# Patient Record
Sex: MALE | Race: WHITE | NOT HISPANIC OR LATINO | Employment: OTHER | URBAN - METROPOLITAN AREA
[De-identification: names, ages, dates, MRNs, and addresses within clinical notes are randomized per-mention and may not be internally consistent; named-entity substitution may affect disease eponyms.]

---

## 2023-08-26 ENCOUNTER — APPOINTMENT (OUTPATIENT)
Dept: CT IMAGING | Facility: HOSPITAL | Age: 63
DRG: 417 | End: 2023-08-26
Payer: MEDICARE

## 2023-08-26 ENCOUNTER — HOSPITAL ENCOUNTER (INPATIENT)
Facility: HOSPITAL | Age: 63
LOS: 1 days | Discharge: HOME | DRG: 417 | End: 2023-08-30
Attending: EMERGENCY MEDICINE | Admitting: INTERNAL MEDICINE
Payer: COMMERCIAL

## 2023-08-26 DIAGNOSIS — I26.99 OTHER ACUTE PULMONARY EMBOLISM WITHOUT ACUTE COR PULMONALE: Primary | ICD-10-CM

## 2023-08-26 DIAGNOSIS — K81.9 CHOLECYSTITIS: ICD-10-CM

## 2023-08-26 DIAGNOSIS — K80.20 GALLSTONES: ICD-10-CM

## 2023-08-26 PROBLEM — E78.5 HYPERLIPIDEMIA: Status: ACTIVE | Noted: 2023-08-26

## 2023-08-26 PROBLEM — E66.9 OBESITY (BMI 30-39.9): Status: ACTIVE | Noted: 2023-08-26

## 2023-08-26 PROBLEM — I10 ESSENTIAL HYPERTENSION: Status: ACTIVE | Noted: 2023-08-26

## 2023-08-26 LAB
ALBUMIN SERPL-MCNC: 4 G/DL (ref 3.5–5.2)
ALBUMIN/GLOB SERPL: 1.5 G/DL
ALP SERPL-CCNC: 86 U/L (ref 39–117)
ALT SERPL W P-5'-P-CCNC: 22 U/L (ref 1–41)
ANION GAP SERPL CALCULATED.3IONS-SCNC: 10 MMOL/L (ref 5–15)
AST SERPL-CCNC: 18 U/L (ref 1–40)
BASOPHILS # BLD AUTO: 0.03 10*3/MM3 (ref 0–0.2)
BASOPHILS NFR BLD AUTO: 0.4 % (ref 0–1.5)
BILIRUB SERPL-MCNC: 0.4 MG/DL (ref 0–1.2)
BILIRUB UR QL STRIP: NEGATIVE
BUN SERPL-MCNC: 17 MG/DL (ref 8–23)
BUN/CREAT SERPL: 13.3 (ref 7–25)
CALCIUM SPEC-SCNC: 8.7 MG/DL (ref 8.6–10.5)
CHLORIDE SERPL-SCNC: 109 MMOL/L (ref 98–107)
CLARITY UR: CLEAR
CO2 SERPL-SCNC: 23 MMOL/L (ref 22–29)
COLOR UR: YELLOW
CREAT SERPL-MCNC: 1.28 MG/DL (ref 0.76–1.27)
D-LACTATE SERPL-SCNC: 1.3 MMOL/L (ref 0.5–2)
DEPRECATED RDW RBC AUTO: 41.9 FL (ref 37–54)
EGFRCR SERPLBLD CKD-EPI 2021: 62.1 ML/MIN/1.73
EOSINOPHIL # BLD AUTO: 0.05 10*3/MM3 (ref 0–0.4)
EOSINOPHIL NFR BLD AUTO: 0.7 % (ref 0.3–6.2)
ERYTHROCYTE [DISTWIDTH] IN BLOOD BY AUTOMATED COUNT: 12.9 % (ref 12.3–15.4)
GEN 5 2HR TROPONIN T REFLEX: 10 NG/L
GLOBULIN UR ELPH-MCNC: 2.7 GM/DL
GLUCOSE SERPL-MCNC: 112 MG/DL (ref 65–99)
GLUCOSE UR STRIP-MCNC: NEGATIVE MG/DL
HCT VFR BLD AUTO: 44.8 % (ref 37.5–51)
HGB BLD-MCNC: 14.6 G/DL (ref 13–17.7)
HGB UR QL STRIP.AUTO: NEGATIVE
HOLD SPECIMEN: NORMAL
IMM GRANULOCYTES # BLD AUTO: 0.03 10*3/MM3 (ref 0–0.05)
IMM GRANULOCYTES NFR BLD AUTO: 0.4 % (ref 0–0.5)
KETONES UR QL STRIP: ABNORMAL
LEUKOCYTE ESTERASE UR QL STRIP.AUTO: NEGATIVE
LIPASE SERPL-CCNC: 51 U/L (ref 13–60)
LYMPHOCYTES # BLD AUTO: 0.75 10*3/MM3 (ref 0.7–3.1)
LYMPHOCYTES NFR BLD AUTO: 9.8 % (ref 19.6–45.3)
MCH RBC QN AUTO: 28.8 PG (ref 26.6–33)
MCHC RBC AUTO-ENTMCNC: 32.6 G/DL (ref 31.5–35.7)
MCV RBC AUTO: 88.4 FL (ref 79–97)
MONOCYTES # BLD AUTO: 0.68 10*3/MM3 (ref 0.1–0.9)
MONOCYTES NFR BLD AUTO: 8.9 % (ref 5–12)
NEUTROPHILS NFR BLD AUTO: 6.1 10*3/MM3 (ref 1.7–7)
NEUTROPHILS NFR BLD AUTO: 79.8 % (ref 42.7–76)
NITRITE UR QL STRIP: NEGATIVE
NRBC BLD AUTO-RTO: 0 /100 WBC (ref 0–0.2)
NT-PROBNP SERPL-MCNC: 97.5 PG/ML (ref 0–900)
PH UR STRIP.AUTO: 5.5 [PH] (ref 5–8)
PLATELET # BLD AUTO: 294 10*3/MM3 (ref 140–450)
PMV BLD AUTO: 9.6 FL (ref 6–12)
POTASSIUM SERPL-SCNC: 4.3 MMOL/L (ref 3.5–5.2)
PROT SERPL-MCNC: 6.7 G/DL (ref 6–8.5)
PROT UR QL STRIP: NEGATIVE
QT INTERVAL: 466 MS
QTC INTERVAL: 398 MS
RBC # BLD AUTO: 5.07 10*6/MM3 (ref 4.14–5.8)
SODIUM SERPL-SCNC: 142 MMOL/L (ref 136–145)
SP GR UR STRIP: 1.02 (ref 1–1.03)
TROPONIN T DELTA: 0 NG/L
TROPONIN T SERPL HS-MCNC: 10 NG/L
UFH PPP CHRO-ACNC: 0.47 IU/ML (ref 0.3–0.7)
UROBILINOGEN UR QL STRIP: ABNORMAL
WBC NRBC COR # BLD: 7.64 10*3/MM3 (ref 3.4–10.8)
WHOLE BLOOD HOLD COAG: NORMAL
WHOLE BLOOD HOLD SPECIMEN: NORMAL

## 2023-08-26 PROCEDURE — 80053 COMPREHEN METABOLIC PANEL: CPT | Performed by: EMERGENCY MEDICINE

## 2023-08-26 PROCEDURE — 25010000002 HEPARIN (PORCINE) 25000-0.45 UT/250ML-% SOLUTION: Performed by: NURSE PRACTITIONER

## 2023-08-26 PROCEDURE — 83880 ASSAY OF NATRIURETIC PEPTIDE: CPT | Performed by: NURSE PRACTITIONER

## 2023-08-26 PROCEDURE — 74177 CT ABD & PELVIS W/CONTRAST: CPT

## 2023-08-26 PROCEDURE — G0378 HOSPITAL OBSERVATION PER HR: HCPCS

## 2023-08-26 PROCEDURE — 25010000002 HEPARIN (PORCINE) PER 1000 UNITS: Performed by: NURSE PRACTITIONER

## 2023-08-26 PROCEDURE — 99285 EMERGENCY DEPT VISIT HI MDM: CPT

## 2023-08-26 PROCEDURE — 81003 URINALYSIS AUTO W/O SCOPE: CPT | Performed by: EMERGENCY MEDICINE

## 2023-08-26 PROCEDURE — 83690 ASSAY OF LIPASE: CPT | Performed by: EMERGENCY MEDICINE

## 2023-08-26 PROCEDURE — 36415 COLL VENOUS BLD VENIPUNCTURE: CPT

## 2023-08-26 PROCEDURE — 99291 CRITICAL CARE FIRST HOUR: CPT

## 2023-08-26 PROCEDURE — 71275 CT ANGIOGRAPHY CHEST: CPT

## 2023-08-26 PROCEDURE — 84484 ASSAY OF TROPONIN QUANT: CPT | Performed by: NURSE PRACTITIONER

## 2023-08-26 PROCEDURE — 25510000001 IOPAMIDOL PER 1 ML: Performed by: EMERGENCY MEDICINE

## 2023-08-26 PROCEDURE — 85520 HEPARIN ASSAY: CPT

## 2023-08-26 PROCEDURE — 83605 ASSAY OF LACTIC ACID: CPT | Performed by: EMERGENCY MEDICINE

## 2023-08-26 PROCEDURE — 25010000002 HEPARIN (PORCINE) 25000-0.45 UT/250ML-% SOLUTION: Performed by: PHARMACIST

## 2023-08-26 PROCEDURE — 85025 COMPLETE CBC W/AUTO DIFF WBC: CPT | Performed by: EMERGENCY MEDICINE

## 2023-08-26 PROCEDURE — 93005 ELECTROCARDIOGRAM TRACING: CPT | Performed by: NURSE PRACTITIONER

## 2023-08-26 RX ORDER — AMLODIPINE BESYLATE 5 MG/1
5 TABLET ORAL DAILY
Status: DISCONTINUED | OUTPATIENT
Start: 2023-08-26 | End: 2023-08-30 | Stop reason: HOSPADM

## 2023-08-26 RX ORDER — MORPHINE SULFATE 2 MG/ML
2 INJECTION, SOLUTION INTRAMUSCULAR; INTRAVENOUS
Status: DISCONTINUED | OUTPATIENT
Start: 2023-08-26 | End: 2023-08-30 | Stop reason: HOSPADM

## 2023-08-26 RX ORDER — LOSARTAN POTASSIUM 50 MG/1
100 TABLET ORAL DAILY
Status: DISCONTINUED | OUTPATIENT
Start: 2023-08-26 | End: 2023-08-26

## 2023-08-26 RX ORDER — MULTIPLE VITAMINS W/ MINERALS TAB 9MG-400MCG
1 TAB ORAL DAILY
COMMUNITY

## 2023-08-26 RX ORDER — HYDROCODONE BITARTRATE AND ACETAMINOPHEN 5; 325 MG/1; MG/1
1 TABLET ORAL EVERY 4 HOURS PRN
Status: DISCONTINUED | OUTPATIENT
Start: 2023-08-26 | End: 2023-08-30 | Stop reason: HOSPADM

## 2023-08-26 RX ORDER — NAPROXEN 500 MG/1
500 TABLET ORAL 2 TIMES DAILY WITH MEALS
COMMUNITY
End: 2023-08-30 | Stop reason: HOSPADM

## 2023-08-26 RX ORDER — HEPARIN SODIUM 1000 [USP'U]/ML
2000 INJECTION, SOLUTION INTRAVENOUS; SUBCUTANEOUS AS NEEDED
Status: DISCONTINUED | OUTPATIENT
Start: 2023-08-26 | End: 2023-08-26

## 2023-08-26 RX ORDER — HEPARIN SODIUM 1000 [USP'U]/ML
9000 INJECTION, SOLUTION INTRAVENOUS; SUBCUTANEOUS ONCE
Status: COMPLETED | OUTPATIENT
Start: 2023-08-26 | End: 2023-08-26

## 2023-08-26 RX ORDER — HEPARIN SODIUM 1000 [USP'U]/ML
4000 INJECTION, SOLUTION INTRAVENOUS; SUBCUTANEOUS AS NEEDED
Status: DISCONTINUED | OUTPATIENT
Start: 2023-08-26 | End: 2023-08-26

## 2023-08-26 RX ORDER — MELATONIN
1000 DAILY
COMMUNITY

## 2023-08-26 RX ORDER — SODIUM CHLORIDE 9 MG/ML
10 INJECTION INTRAVENOUS AS NEEDED
Status: DISCONTINUED | OUTPATIENT
Start: 2023-08-26 | End: 2023-08-30 | Stop reason: HOSPADM

## 2023-08-26 RX ORDER — HEPARIN SODIUM 10000 [USP'U]/100ML
13.5 INJECTION, SOLUTION INTRAVENOUS
Status: DISCONTINUED | OUTPATIENT
Start: 2023-08-26 | End: 2023-08-27

## 2023-08-26 RX ORDER — ATENOLOL 50 MG/1
50 TABLET ORAL DAILY
Status: DISCONTINUED | OUTPATIENT
Start: 2023-08-26 | End: 2023-08-27

## 2023-08-26 RX ORDER — PANTOPRAZOLE SODIUM 40 MG/1
40 TABLET, DELAYED RELEASE ORAL DAILY
Status: DISCONTINUED | OUTPATIENT
Start: 2023-08-26 | End: 2023-08-30 | Stop reason: HOSPADM

## 2023-08-26 RX ORDER — ONDANSETRON 2 MG/ML
4 INJECTION INTRAMUSCULAR; INTRAVENOUS EVERY 6 HOURS PRN
Status: DISCONTINUED | OUTPATIENT
Start: 2023-08-26 | End: 2023-08-30 | Stop reason: HOSPADM

## 2023-08-26 RX ADMIN — PANTOPRAZOLE SODIUM 40 MG: 40 TABLET, DELAYED RELEASE ORAL at 16:09

## 2023-08-26 RX ADMIN — AMLODIPINE BESYLATE 5 MG: 5 TABLET ORAL at 16:09

## 2023-08-26 RX ADMIN — IOPAMIDOL 85 ML: 755 INJECTION, SOLUTION INTRAVENOUS at 13:00

## 2023-08-26 RX ADMIN — HEPARIN SODIUM 9000 UNITS: 1000 INJECTION, SOLUTION INTRAVENOUS; SUBCUTANEOUS at 14:25

## 2023-08-26 RX ADMIN — HEPARIN SODIUM 13.5 UNITS/KG/HR: 10000 INJECTION, SOLUTION INTRAVENOUS at 14:26

## 2023-08-26 RX ADMIN — HEPARIN SODIUM 13.5 UNITS/KG/HR: 10000 INJECTION, SOLUTION INTRAVENOUS at 16:09

## 2023-08-26 RX ADMIN — SODIUM CHLORIDE 1000 ML: 9 INJECTION, SOLUTION INTRAVENOUS at 11:47

## 2023-08-26 NOTE — H&P
[facility] Medicine Services  HISTORY AND PHYSICAL    Patient Name: Juan Suero  : 1960  MRN: [id number]  Primary Care Physician: Provider, No Known    Subjective   Subjective     Chief Complaint:ruq abdominal pain    HPI:  Juan Suero is a 63 y.o. male who  has a past medical history of Hyperlipidemia and Hypertension. who presented to the ED with 3 days of persistent RUQ abdominal pain worse with a deep breath and initiated after eating several slices of singh. He has been traveling every other week to and from [place] where his wife is being treated at [facility] for breast and metastatic colon cancer. He denies ever having pain like this before but has been unable to sleep for 3 days and had a poor appetite.      Review of Systems   Patient denies weight loss, headaches, changes in vision, fever, chills, sore throat, shortness of breath, cough, nausea or vomiting, diarrhea, change in urine output or habits, joint pain, rash, itching or bleeding.    Otherwise complete ROS is negative except as mentioned in the HPI.    Personal History       PMH: He  has a past medical history of Hyperlipidemia and Hypertension.   PSxH: He  has a past surgical history that includes Hernia repair and [place] tooth extraction.         FH: His family history is reviewed and NOT contributory.   SH: He  reports that he has never smoked. He has never used smokeless tobacco. He reports current alcohol use. Drug use questions deferred to the physician.   Retired  from [employer]  Medications:  amLODIPine, aspirin, atenolol, cholecalciferol, losartan, multivitamin with minerals, naproxen, and pantoprazole    Allergies   Allergen Reactions    Sulfa Antibiotics Hives       Objective   Objective     Vital Signs:   Temp:  [98.1 °F (36.7 °C)] 98.1 °F (36.7 °C)  Heart Rate:  [45-61] 50  Resp:  [18] 18  BP: (148-170)/(84-98) 156/92    Constitutional: Awake, alert, interactive and pleasant, uncomfortable  Eyes:  clear sclerae, no conjunctival injection  HENT: NCAT, mucous membranes moist  Neck: no masses or lymphadenopathy, trachea midline  Respiratory: good breath sounds bilaterally, respirations unlabored  Cardiovascular: RRR, no murmurs appreciated, palpable peripheral pulses  Abdomen:  soft, no HSM or masses palpable, exquisitely tender RUQ with rebound and guarding  Musculoskeletal: No peripheral edema, clubbing or cyanosis  Neurologic: Oriented x 3,                       Strength symmetric in all extremities                     Cranial Nerves grossly intact, speech clear  Skin: No rashes or jaundice  Psychiatric: Appropriate mood, insight      Result Review:  I have personally reviewed the results from the time of this admission   to 8/26/2023 14:41 EDT and agree with these findings:  [x]  Laboratory  [x]  Microbiology  [x]  Radiology  [x]  EKG/Telemetry   []  Cardiology/Vascular   []  Pathology  [x]  Old records  []  Other:  Most notable findings include: normal CBC and CMP, slight bump in creatinine and glucose, normal lactic acid      LAB RESULTS:      Lab 08/26/23  1038   WBC 7.64   HEMOGLOBIN 14.6   HEMATOCRIT 44.8   PLATELETS 294   NEUTROS ABS 6.10   IMMATURE GRANS (ABS) 0.03   LYMPHS ABS 0.75   MONOS ABS 0.68   EOS ABS 0.05   MCV 88.4   LACTATE 1.3         Lab 08/26/23  1038   SODIUM 142   POTASSIUM 4.3   CHLORIDE 109*   CO2 23.0   ANION GAP 10.0   BUN 17   CREATININE 1.28*   EGFR 62.1   GLUCOSE 112*   CALCIUM 8.7         Lab 08/26/23  1038   TOTAL PROTEIN 6.7   ALBUMIN 4.0   GLOBULIN 2.7   ALT (SGPT) 22   AST (SGOT) 18   BILIRUBIN 0.4   ALK PHOS 86   LIPASE 51         Lab 08/26/23  1239 08/26/23  1038   PROBNP  --  97.5   HSTROP T 10 10                 Brief Urine Lab Results  (Last result in the past 365 days)        Color   Clarity   Blood   Leuk Est   Nitrite   Protein   CREAT   Urine HCG        08/26/23 1043 Yellow   Clear   Negative   Negative   Negative   Negative                     CT Abdomen Pelvis  With Contrast    Result Date: 8/26/2023  CT ANGIOGRAM CHEST, CT ABDOMEN PELVIS W CONTRAST Date of Exam: 8/26/2023 12:49 PM EDT Indication: PE. HISTORY OF HISTO.. Comparison: None available. Technique: CTA of the chest abdomen and pelvis was performed after the uneventful intravenous administration of 85 mL Isovue-370. Reconstructed coronal and sagittal images were also obtained. In addition, a 3-D volume rendered image was created for interpretation. Automated exposure control and iterative reconstruction methods were used. Findings: Chest: The central tracheobronchial tree is clear. A couple benign calcified granulomas are noted. There is no focal consolidation. There is no pleural effusion. The heart is enlarged. The great vessels are normal in caliber. There are several pulmonary emboli within left upper and right lower lobe segmental branches. There is no evidence of acute right heart strain. No abnormally enlarged lymph nodes are identified. No aggressive osseous lesions are identified. Abdomen/pelvis: The liver, adrenal glands, left kidney, spleen, and pancreas are unremarkable. There are bulky stones in the gallbladder. There is a small right renal cyst. The stomach appears normal. The small bowel appears normal in caliber and configuration. The colon appears normal. The appendix appears normal. There is no ascites or loculated collection. No abnormally enlarged lymph nodes are identified. The rectum, prostate, and urinary bladder are unremarkable. No aggressive osseous lesions are identified.     Impression: Impression: 1.Several pulmonary emboli within left upper and right lower lobe segmental branches. 2.Lungs are clear. 3.Cardiomegaly. 4.No acute process identified within abdomen/pelvis 5.Cholelithiasis. Results were discussed with DONNA Wiley at time of dictation. Electronically Signed: Jonathon Laws MD  8/26/2023 1:20 PM EDT  Workstation ID: [id number]    CT Angiogram Chest    Result Date:  8/26/2023  CT ANGIOGRAM CHEST, CT ABDOMEN PELVIS W CONTRAST Date of Exam: 8/26/2023 12:49 PM EDT Indication: PE. HISTORY OF HISTO.. Comparison: None available. Technique: CTA of the chest abdomen and pelvis was performed after the uneventful intravenous administration of 85 mL Isovue-370. Reconstructed coronal and sagittal images were also obtained. In addition, a 3-D volume rendered image was created for interpretation. Automated exposure control and iterative reconstruction methods were used. Findings: Chest: The central tracheobronchial tree is clear. A couple benign calcified granulomas are noted. There is no focal consolidation. There is no pleural effusion. The heart is enlarged. The great vessels are normal in caliber. There are several pulmonary emboli within left upper and right lower lobe segmental branches. There is no evidence of acute right heart strain. No abnormally enlarged lymph nodes are identified. No aggressive osseous lesions are identified. Abdomen/pelvis: The liver, adrenal glands, left kidney, spleen, and pancreas are unremarkable. There are bulky stones in the gallbladder. There is a small right renal cyst. The stomach appears normal. The small bowel appears normal in caliber and configuration. The colon appears normal. The appendix appears normal. There is no ascites or loculated collection. No abnormally enlarged lymph nodes are identified. The rectum, prostate, and urinary bladder are unremarkable. No aggressive osseous lesions are identified.     Impression: Impression: 1.Several pulmonary emboli within left upper and right lower lobe segmental branches. 2.Lungs are clear. 3.Cardiomegaly. 4.No acute process identified within abdomen/pelvis 5.Cholelithiasis. Results were discussed with DONNA Wiley at time of dictation. Electronically Signed: Jonathon Laws MD  8/26/2023 1:20 PM EDT  Workstation ID: [id number]         The patient has started, but not completed, their COVID-19  vaccination series.    Assessment & Plan   Assessment / Plan       Pulmonary embolism    Gallstones    Essential hypertension    Obesity (BMI 30-39.9)    Hyperlipidemia      Assessment & Plan:  64 yo with HO HTN,HLP and obesity who presented to the ED with 3 days of RUQ abdominal pain     Symptomatic gallstones  -will ask Gen surg to see, keep NPO  -discussed risks of surgery with known PE but also that the gallstones seem to be his primary complaint.  -cont symptomatic medications  -serial exams  -NPO after midnight    Bilateral PE  -patient started on heparin until seen by surgery  -transition to NOAC once surgery has been ruled out  -check Duplex for more clot burden    HTN with elevated creatinine  -cone beta blocker, CCB hold ARB for now  Creatinine 1.19 a year ago    Morbid obesity, BMI 36    Hyperlipidemia  -cont statin    DVT prophylaxis:  Medical DVT prophylaxis orders are present.    CODE STATUS:FULL CODE     Expected Discharge  Expected Discharge Date: 8/27/2023; Expected Discharge Time:     Electronically signed by Aurelia Alonso MD 08/26/23 14:41 EDT

## 2023-08-26 NOTE — ED PROVIDER NOTES
Subjective   History of Present Illness  Pleasant patient who presents to the ER for right upper quadrant abdominal pain.  Off-and-on for last several days.  Tells me he did have a large amount of singh recently.  He typically spends most of his time in [place] and is planning to move here but is currently fixing up the house.  Family at the bedside.  He is concerned about his gallbladder.  He also tells me he has some pleuritic pain in that area as well.  He does have a history of histoplasmosis.  He denies any fevers or chills.  Denies any near syncope.  Denies any urinary symptoms.      Review of Systems    Past Medical History:   Diagnosis Date    Hyperlipidemia     Hypertension        Allergies   Allergen Reactions    Sulfa Antibiotics Hives       Past Surgical History:   Procedure Laterality Date    HERNIA REPAIR      WISDOM TOOTH EXTRACTION         History reviewed. No pertinent family history.    Social History     Socioeconomic History    Marital status:    Tobacco Use    Smoking status: Never    Smokeless tobacco: Never   Substance and Sexual Activity    Alcohol use: Yes    Drug use: Defer    Sexual activity: Defer           Objective   Physical Exam  Constitutional:       Appearance: He is well-developed.   HENT:      Head: Normocephalic and atraumatic.      Right Ear: External ear normal.      Left Ear: External ear normal.      Nose: Nose normal.   Eyes:      Conjunctiva/sclera: Conjunctivae normal.      Pupils: Pupils are equal, round, and reactive to light.   Cardiovascular:      Rate and Rhythm: Normal rate and regular rhythm.      Heart sounds: Normal heart sounds.   Pulmonary:      Effort: Pulmonary effort is normal.      Breath sounds: Normal breath sounds.   Abdominal:      General: Bowel sounds are normal.      Palpations: Abdomen is soft.      Tenderness:  in the right upper quadrant   Musculoskeletal:         General: Normal range of motion.      Cervical back: Normal range of motion and  neck supple.   Skin:     General: Skin is warm and dry.   Neurological:      Mental Status: He is alert and oriented to person, place, and time.   Psychiatric:         Behavior: Behavior normal.         Judgment: Judgment normal.       Critical Care  Performed by: Marty Lee APRN  Authorized by: Jonathon Martínez MD     Critical care provider statement:     Critical care time (minutes):  35    Critical care time was exclusive of:  Separately billable procedures and treating other patients    Critical care was necessary to treat or prevent imminent or life-threatening deterioration of the following conditions: Anticoagulation.  Pulmonary embolism.    Critical care was time spent personally by me on the following activities:  Ordering and performing treatments and interventions, ordering and review of laboratory studies, ordering and review of radiographic studies, pulse oximetry, re-evaluation of patient's condition, review of old charts, obtaining history from patient or surrogate, examination of patient, evaluation of patient's response to treatment, discussions with consultants and development of treatment plan with patient or surrogate           ED Course  ED Course as of 08/26/23 1359   Sat Aug 26, 2023   1241 Awaiting second troponin and CAT scans. [JM]      ED Course User Index  [JM] Marty Lee APRN           CT Angiogram Chest   Final Result   Impression:   1.Several pulmonary emboli within left upper and right lower lobe segmental branches.   2.Lungs are clear.   3.Cardiomegaly.   4.No acute process identified within abdomen/pelvis   5.Cholelithiasis.      Results were discussed with DONNA Wiley at time of dictation.      Electronically Signed: Jonathon Laws MD     8/26/2023 1:20 PM EDT     Workstation ID: [id number]      CT Abdomen Pelvis With Contrast   Final Result   Impression:   1.Several pulmonary emboli within left upper and right lower lobe segmental branches.   2.Lungs are clear.    3.Cardiomegaly.   4.No acute process identified within abdomen/pelvis   5.Cholelithiasis.      Results were discussed with DONNA Wiley at time of dictation.      Electronically Signed: Jonathon Laws MD     8/26/2023 1:20 PM EDT     Workstation ID: [id number]                                        Medical Decision Making  Problems Addressed:  Gallstones: complicated acute illness or injury  Other acute pulmonary embolism without acute cor pulmonale: complicated acute illness or injury    Amount and/or Complexity of Data Reviewed  External Data Reviewed: labs, radiology and ECG.  Labs: ordered.  Radiology: ordered.  ECG/medicine tests: ordered.    Risk  Prescription drug management.  Decision regarding hospitalization.    Critical Care  Total time providing critical care: 35 minutes      Final diagnoses:   Other acute pulmonary embolism without acute cor pulmonale   Gallstones       ED Disposition  ED Disposition       ED Disposition   Decision to Admit    Condition   --    Comment   --               No follow-up provider specified.       Medication List      No changes were made to your prescriptions during this visit.            Marty Lee APRN  08/26/23 8544

## 2023-08-26 NOTE — CONSULTS
General Surgery Consultation Note    Date of Service: 8/26/2023  Juan Suero  [id number]  1960      Referring Provider: Aurelia Alonso MD    Location of Consult: Inpatient     Reason for Consultation: Symptomatic cholelithiasis       History of Present Illness:  I am seeing, Juan Suero, in consultation for Aurelia Alonso MD regarding symptomatic cholelithiasis.  63-year-old gentleman with hypertension, hypercholesterolemia, along with known gallstones presents with 2 days of right upper quadrant abdominal pain.  This began after eating breakfast and has persisted x48 hours.  At the behest of his daughter he came for evaluation.  He was worked up in the emergency room with a CT scan of the chest, abdomen, and pelvis which demonstrated bilateral pulmonary emboli and cholelithiasis.  He denies any significant shortness of breath currently.  He does have some pleuritic discomfort.  Otherwise he denies nausea, vomiting, scleral icterus, dark urine, or acholic stools.    Problems Addressed this Visit          Coag and Thromboembolic    * (Principal) Pulmonary embolism - Primary       Gastrointestinal Abdominal     Gallstones     Diagnoses         Codes Comments    Other acute pulmonary embolism without acute cor pulmonale    -  Primary ICD-10-CM: I26.99  ICD-9-CM: 415.19     Gallstones     ICD-10-CM: K80.20  ICD-9-CM: 574.20             Past Medical History:   Diagnosis Date    Hyperlipidemia     Hypertension        Past Surgical History:    HERNIA REPAIR    WISDOM TOOTH EXTRACTION       Allergies   Allergen Reactions    Sulfa Antibiotics Hives       No current facility-administered medications on file prior to encounter.     Current Outpatient Medications on File Prior to Encounter   Medication Sig Dispense Refill    amLODIPine (NORVASC) 5 MG tablet Take 1 tablet by mouth Daily.      aspirin 81 MG EC tablet Take 1 tablet by mouth Daily.      atenolol (TENORMIN) 50 MG tablet Take 1 tablet by mouth Daily.       cholecalciferol (VITAMIN D3) 25 MCG (1000 UT) tablet Take 1 tablet by mouth Daily.      losartan (COZAAR) 100 MG tablet Take 1 tablet by mouth Daily.  1    multivitamin with minerals tablet tablet Take 1 tablet by mouth Daily.      naproxen (NAPROSYN) 500 MG tablet Take 1 tablet by mouth 2 (Two) Times a Day With Meals.      pantoprazole (PROTONIX) 40 MG EC tablet Take 1 tablet by mouth Daily.      [DISCONTINUED] sildenafil (VIAGRA) 100 MG tablet TAKE ONE HALF TO ONE TABLET BY MOUTH 1/2 HOUR BEFORE SEXUAL INTERCOURSE           Current Facility-Administered Medications:     amLODIPine (NORVASC) tablet 5 mg, 5 mg, Oral, Daily, Aurelia Alonso MD, 5 mg at 08/26/23 1609    atenolol (TENORMIN) tablet 50 mg, 50 mg, Oral, Daily, Aurelia Alonso MD    heparin 48856 units/250 mL (100 units/mL) in 0.45 % NaCl infusion, 13.5 Units/kg/hr, Intravenous, Titrated, Marty Lee APRN, Last Rate: 15.25 mL/hr at 08/26/23 1609, 13.5 Units/kg/hr at 08/26/23 1609    HYDROcodone-acetaminophen (NORCO) 5-325 MG per tablet 1 tablet, 1 tablet, Oral, Q4H PRN, Aurelia lAonso MD    morphine injection 2 mg, 2 mg, Intravenous, Q2H PRN, Aurelia Alonso MD    ondansetron (ZOFRAN) injection 4 mg, 4 mg, Intravenous, Q6H PRN, Aurelia Alonso MD    pantoprazole (PROTONIX) EC tablet 40 mg, 40 mg, Oral, Daily, Aurelia Alonso MD, 40 mg at 08/26/23 1609    Pharmacy to Dose Heparin, , Does not apply, Continuous PRN, Marty Lee APRN    Sodium Chloride (PF) 0.9 % 10 mL, 10 mL, Intravenous, PRN, Jonathon Martínez MD    History reviewed. No pertinent family history.  Social History     Socioeconomic History    Marital status:    Tobacco Use    Smoking status: Never    Smokeless tobacco: Never   Vaping Use    Vaping Use: Never used   Substance and Sexual Activity    Alcohol use: Yes    Drug use: Defer    Sexual activity: Defer       Review of Systems:  Review of Systems   Constitutional:  Negative for appetite change and fever.   HENT:   "Negative for drooling, facial swelling and mouth sores.    Eyes:  Negative for photophobia and visual disturbance.   Respiratory:  Negative for cough and shortness of breath.    Cardiovascular:  Negative for palpitations and leg swelling.   Gastrointestinal:  Positive for abdominal pain. Negative for nausea and vomiting.   Endocrine: Negative for polyphagia and polyuria.   Genitourinary:  Negative for difficulty urinating, flank pain and urgency.   Musculoskeletal:  Negative for arthralgias, joint swelling and neck pain.   Skin:  Negative for pallor and rash.   Allergic/Immunologic: Negative for immunocompromised state.   Neurological:  Negative for tremors, seizures and headaches.   Hematological:  Negative for adenopathy.   Psychiatric/Behavioral:  Negative for confusion and sleep disturbance. The patient is not hyperactive.    Otherwise the 12 point review of systems is negative.    BP (!) 187/99 (BP Location: Right arm, Patient Position: Lying)   Pulse (!) 44   Temp 98.3 °F (36.8 °C) (Oral)   Resp 18   Ht 177.8 cm (70\")   Wt 113 kg (250 lb)   SpO2 93%   BMI 35.87 kg/m²   Body mass index is 35.87 kg/m².    General: Minimal distress  HEENT: PER, no icterus, normal sclerae  Cardiac: regular rhythm,  no audible rubs  Pulmonary: bilateral breath sounds, nonlabored  Abdominal: Soft, right upper quadrant tenderness, no generalized peritonitis  Neurologic: awake, alert, no obvious focal deficits  Extremities: warm, no edema  Skin: no obvious rashes nor worrisome lesions seen     CBC  Results from last 7 days   Lab Units 08/26/23  1038   WBC 10*3/mm3 7.64   HEMOGLOBIN g/dL 14.6   HEMATOCRIT % 44.8   PLATELETS 10*3/mm3 294       CMP  Results from last 7 days   Lab Units 08/26/23  1038   SODIUM mmol/L 142   POTASSIUM mmol/L 4.3   CHLORIDE mmol/L 109*   CO2 mmol/L 23.0   BUN mg/dL 17   CREATININE mg/dL 1.28*   CALCIUM mg/dL 8.7   BILIRUBIN mg/dL 0.4   ALK PHOS U/L 86   ALT (SGPT) U/L 22   AST (SGOT) U/L 18   GLUCOSE " mg/dL 112*       Radiology  Imaging Results (Last 72 Hours)       Procedure Component Value Units Date/Time    CT Angiogram Chest [[id number]] Collected: 08/26/23 1306     Updated: 08/26/23 1323    Narrative:      CT ANGIOGRAM CHEST, CT ABDOMEN PELVIS W CONTRAST    Date of Exam: 8/26/2023 12:49 PM EDT    Indication: PE. HISTORY OF HISTO..    Comparison: None available.    Technique: CTA of the chest abdomen and pelvis was performed after the uneventful intravenous administration of 85 mL Isovue-370. Reconstructed coronal and sagittal images were also obtained. In addition, a 3-D volume rendered image was created for   interpretation. Automated exposure control and iterative reconstruction methods were used.      Findings:  Chest:    The central tracheobronchial tree is clear. A couple benign calcified granulomas are noted. There is no focal consolidation. There is no pleural effusion.    The heart is enlarged. The great vessels are normal in caliber. There are several pulmonary emboli within left upper and right lower lobe segmental branches. There is no evidence of acute right heart strain. No abnormally enlarged lymph nodes are   identified.    No aggressive osseous lesions are identified.    Abdomen/pelvis:    The liver, adrenal glands, left kidney, spleen, and pancreas are unremarkable. There are bulky stones in the gallbladder. There is a small right renal cyst.    The stomach appears normal. The small bowel appears normal in caliber and configuration. The colon appears normal. The appendix appears normal. There is no ascites or loculated collection. No abnormally enlarged lymph nodes are identified.    The rectum, prostate, and urinary bladder are unremarkable.    No aggressive osseous lesions are identified.      Impression:      Impression:  1.Several pulmonary emboli within left upper and right lower lobe segmental branches.  2.Lungs are clear.  3.Cardiomegaly.  4.No acute process identified within  abdomen/pelvis  5.Cholelithiasis.    Results were discussed with DONNA Wiley at time of dictation.    Electronically Signed: Jonathon Laws MD    8/26/2023 1:20 PM EDT    Workstation ID: [id number]    CT Abdomen Pelvis With Contrast [[id number]] Collected: 08/26/23 1306     Updated: 08/26/23 1323    Narrative:      CT ANGIOGRAM CHEST, CT ABDOMEN PELVIS W CONTRAST    Date of Exam: 8/26/2023 12:49 PM EDT    Indication: PE. HISTORY OF HISTO..    Comparison: None available.    Technique: CTA of the chest abdomen and pelvis was performed after the uneventful intravenous administration of 85 mL Isovue-370. Reconstructed coronal and sagittal images were also obtained. In addition, a 3-D volume rendered image was created for   interpretation. Automated exposure control and iterative reconstruction methods were used.      Findings:  Chest:    The central tracheobronchial tree is clear. A couple benign calcified granulomas are noted. There is no focal consolidation. There is no pleural effusion.    The heart is enlarged. The great vessels are normal in caliber. There are several pulmonary emboli within left upper and right lower lobe segmental branches. There is no evidence of acute right heart strain. No abnormally enlarged lymph nodes are   identified.    No aggressive osseous lesions are identified.    Abdomen/pelvis:    The liver, adrenal glands, left kidney, spleen, and pancreas are unremarkable. There are bulky stones in the gallbladder. There is a small right renal cyst.    The stomach appears normal. The small bowel appears normal in caliber and configuration. The colon appears normal. The appendix appears normal. There is no ascites or loculated collection. No abnormally enlarged lymph nodes are identified.    The rectum, prostate, and urinary bladder are unremarkable.    No aggressive osseous lesions are identified.      Impression:      Impression:  1.Several pulmonary emboli within left upper and right lower  lobe segmental branches.  2.Lungs are clear.  3.Cardiomegaly.  4.No acute process identified within abdomen/pelvis  5.Cholelithiasis.    Results were discussed with DONNA Wiley at time of dictation.    Electronically Signed: Jonathon Laws MD    8/26/2023 1:20 PM EDT    Workstation ID: [id number]              Assessment:  Symptomatic cholelithiasis with persistent pain  Pulmonary emboli  Hypertension    Plan:  I reviewed the CT scan of the chest, abdomen, and pelvis personally.  I reviewed his blood work.  Regarding his continued symptoms and gallstones I do think that it is appropriate for cholecystectomy.  Regarding his current pulmonary emboli and he has no evidence of right heart strain, he is not on supplemental oxygen.  And he does not feel shortness of breath at the moment.  I am going to hold his heparin drip at midnight.  I discussed the risks and benefits associated with cholecystectomy including, but not limited to: bleeding, infection, injury to adjacent viscera (duodenum, common bile duct, etc.), worsening respiratory failure, retained stones, need for ERCP, an open operation in general, bile leak, biloma formation, chronic pain, incisional hernia formation, and medical issues from a cardiopulmonary deep venous thrombosis standpoint.  His daughter was also present for our examination and conversation.  All questions were answered, they understand, and wish to proceed with cholecystectomy.  N.p.o., IV antibiotics, hold heparin drip at midnight, and cholecystectomy.      Lai Cuadra MD  08/26/23  19:34 EDT

## 2023-08-26 NOTE — Clinical Note
Level of Care: Telemetry [5]   Diagnosis: Pulmonary embolism [130195]   Admitting Physician: PAL MATHIAS [[id number]]   Attending Physician: PAL MATHIAS [[id number]]

## 2023-08-27 ENCOUNTER — APPOINTMENT (OUTPATIENT)
Dept: CARDIOLOGY | Facility: HOSPITAL | Age: 63
DRG: 417 | End: 2023-08-27
Payer: MEDICARE

## 2023-08-27 ENCOUNTER — ANESTHESIA (OUTPATIENT)
Dept: PERIOP | Facility: HOSPITAL | Age: 63
DRG: 417 | End: 2023-08-27
Payer: MEDICARE

## 2023-08-27 ENCOUNTER — ANESTHESIA EVENT (OUTPATIENT)
Dept: PERIOP | Facility: HOSPITAL | Age: 63
DRG: 417 | End: 2023-08-27
Payer: MEDICARE

## 2023-08-27 ENCOUNTER — APPOINTMENT (OUTPATIENT)
Dept: CARDIOLOGY | Facility: HOSPITAL | Age: 63
DRG: 417 | End: 2023-08-27
Payer: COMMERCIAL

## 2023-08-27 LAB
ALBUMIN SERPL-MCNC: 3.7 G/DL (ref 3.5–5.2)
ALBUMIN/GLOB SERPL: 1.8 G/DL
ALP SERPL-CCNC: 80 U/L (ref 39–117)
ALT SERPL W P-5'-P-CCNC: 20 U/L (ref 1–41)
ANION GAP SERPL CALCULATED.3IONS-SCNC: 8 MMOL/L (ref 5–15)
AST SERPL-CCNC: 16 U/L (ref 1–40)
BASOPHILS # BLD AUTO: 0.03 10*3/MM3 (ref 0–0.2)
BASOPHILS NFR BLD AUTO: 0.5 % (ref 0–1.5)
BH CV ECHO MEAS - AO MAX PG: 9 MMHG
BH CV ECHO MEAS - AO MEAN PG: 5 MMHG
BH CV ECHO MEAS - AO ROOT AREA (BSA CORRECTED): 1.7 CM2
BH CV ECHO MEAS - AO ROOT DIAM: 3.8 CM
BH CV ECHO MEAS - AO V2 MAX: 150 CM/SEC
BH CV ECHO MEAS - AO V2 VTI: 36.4 CM
BH CV ECHO MEAS - AVA(I,D): 3 CM2
BH CV ECHO MEAS - EDV(CUBED): 250 ML
BH CV ECHO MEAS - EDV(MOD-SP2): 181 ML
BH CV ECHO MEAS - EDV(MOD-SP4): 220 ML
BH CV ECHO MEAS - EF(MOD-BP): 55.7 %
BH CV ECHO MEAS - EF(MOD-SP2): 56.7 %
BH CV ECHO MEAS - EF(MOD-SP4): 54.7 %
BH CV ECHO MEAS - ESV(CUBED): 32.8 ML
BH CV ECHO MEAS - ESV(MOD-SP2): 78.3 ML
BH CV ECHO MEAS - ESV(MOD-SP4): 99.6 ML
BH CV ECHO MEAS - FS: 49.2 %
BH CV ECHO MEAS - IVS/LVPW: 1.25 CM
BH CV ECHO MEAS - IVSD: 1 CM
BH CV ECHO MEAS - LA DIMENSION: 5 CM
BH CV ECHO MEAS - LAT PEAK E' VEL: 11.7 CM/SEC
BH CV ECHO MEAS - LV DIASTOLIC VOL/BSA (35-75): 95.9 CM2
BH CV ECHO MEAS - LV MASS(C)D: 234.7 GRAMS
BH CV ECHO MEAS - LV MAX PG: 5.3 MMHG
BH CV ECHO MEAS - LV MEAN PG: 3 MMHG
BH CV ECHO MEAS - LV SYSTOLIC VOL/BSA (12-30): 43.4 CM2
BH CV ECHO MEAS - LV V1 MAX: 115 CM/SEC
BH CV ECHO MEAS - LV V1 VTI: 28.9 CM
BH CV ECHO MEAS - LVIDD: 6.3 CM
BH CV ECHO MEAS - LVIDS: 3.2 CM
BH CV ECHO MEAS - LVOT AREA: 3.8 CM2
BH CV ECHO MEAS - LVOT DIAM: 2.2 CM
BH CV ECHO MEAS - LVPWD: 0.8 CM
BH CV ECHO MEAS - MED PEAK E' VEL: 8.1 CM/SEC
BH CV ECHO MEAS - MV A MAX VEL: 90.7 CM/SEC
BH CV ECHO MEAS - MV DEC SLOPE: 355 CM/SEC2
BH CV ECHO MEAS - MV DEC TIME: 0.19 MSEC
BH CV ECHO MEAS - MV E MAX VEL: 65.5 CM/SEC
BH CV ECHO MEAS - MV E/A: 0.72
BH CV ECHO MEAS - PA ACC TIME: 0.11 SEC
BH CV ECHO MEAS - RAP SYSTOLE: 3 MMHG
BH CV ECHO MEAS - RVSP: 23 MMHG
BH CV ECHO MEAS - SI(MOD-SP2): 44.8 ML/M2
BH CV ECHO MEAS - SI(MOD-SP4): 52.5 ML/M2
BH CV ECHO MEAS - SV(LVOT): 109.9 ML
BH CV ECHO MEAS - SV(MOD-SP2): 102.7 ML
BH CV ECHO MEAS - SV(MOD-SP4): 120.4 ML
BH CV ECHO MEAS - TAPSE (>1.6): 1.92 CM
BH CV ECHO MEAS - TR MAX PG: 19.4 MMHG
BH CV ECHO MEAS - TR MAX VEL: 220 CM/SEC
BH CV ECHO MEAS RV FREE WALL STRAIN: -24.4 %
BH CV ECHO MEASUREMENTS AVERAGE E/E' RATIO: 6.62
BH CV LOW VAS LEFT POSTERIOR TIBIAL VESSEL: 1
BH CV LOWER VASCULAR LEFT COMMON FEMORAL COMPRESS: NORMAL
BH CV LOWER VASCULAR LEFT COMMON FEMORAL PHASIC: NORMAL
BH CV LOWER VASCULAR LEFT COMMON FEMORAL SPONT: NORMAL
BH CV LOWER VASCULAR LEFT DISTAL FEMORAL COMPRESS: NORMAL
BH CV LOWER VASCULAR LEFT DISTAL FEMORAL PHASIC: NORMAL
BH CV LOWER VASCULAR LEFT DISTAL FEMORAL SPONT: NORMAL
BH CV LOWER VASCULAR LEFT GASTRONEMIUS COMPRESS: NORMAL
BH CV LOWER VASCULAR LEFT GREATER SAPH AK COMPRESS: NORMAL
BH CV LOWER VASCULAR LEFT GREATER SAPH BK COMPRESS: NORMAL
BH CV LOWER VASCULAR LEFT LESSER SAPH COMPRESS: NORMAL
BH CV LOWER VASCULAR LEFT MID FEMORAL COMPRESS: NORMAL
BH CV LOWER VASCULAR LEFT MID FEMORAL PHASIC: NORMAL
BH CV LOWER VASCULAR LEFT MID FEMORAL SPONT: NORMAL
BH CV LOWER VASCULAR LEFT PERONEAL COMPRESS: NORMAL
BH CV LOWER VASCULAR LEFT POPLITEAL COMPRESS: NORMAL
BH CV LOWER VASCULAR LEFT POPLITEAL PHASIC: NORMAL
BH CV LOWER VASCULAR LEFT POPLITEAL SPONT: NORMAL
BH CV LOWER VASCULAR LEFT POSTERIOR TIBIAL COMPRESS: NORMAL
BH CV LOWER VASCULAR LEFT POSTERIOR TIBIAL SPONT: NORMAL
BH CV LOWER VASCULAR LEFT PROFUNDA FEMORAL COMPRESS: NORMAL
BH CV LOWER VASCULAR LEFT PROFUNDA FEMORAL PHASIC: NORMAL
BH CV LOWER VASCULAR LEFT PROFUNDA FEMORAL SPONT: NORMAL
BH CV LOWER VASCULAR LEFT PROXIMAL FEMORAL COMPRESS: NORMAL
BH CV LOWER VASCULAR LEFT PROXIMAL FEMORAL PHASIC: NORMAL
BH CV LOWER VASCULAR LEFT PROXIMAL FEMORAL SPONT: NORMAL
BH CV LOWER VASCULAR LEFT SAPHENOFEMORAL JUNCTION COMPRESS: NORMAL
BH CV LOWER VASCULAR LEFT SAPHENOFEMORAL JUNCTION PHASIC: NORMAL
BH CV LOWER VASCULAR LEFT SAPHENOFEMORAL JUNCTION SPONT: NORMAL
BH CV LOWER VASCULAR RIGHT COMMON FEMORAL COMPRESS: NORMAL
BH CV LOWER VASCULAR RIGHT COMMON FEMORAL PHASIC: NORMAL
BH CV LOWER VASCULAR RIGHT COMMON FEMORAL SPONT: NORMAL
BH CV LOWER VASCULAR RIGHT DISTAL FEMORAL COMPRESS: NORMAL
BH CV LOWER VASCULAR RIGHT DISTAL FEMORAL PHASIC: NORMAL
BH CV LOWER VASCULAR RIGHT DISTAL FEMORAL SPONT: NORMAL
BH CV LOWER VASCULAR RIGHT GASTRONEMIUS COMPRESS: NORMAL
BH CV LOWER VASCULAR RIGHT GREATER SAPH AK COMPRESS: NORMAL
BH CV LOWER VASCULAR RIGHT GREATER SAPH BK COMPRESS: NORMAL
BH CV LOWER VASCULAR RIGHT LESSER SAPH COMPRESS: NORMAL
BH CV LOWER VASCULAR RIGHT MID FEMORAL COMPRESS: NORMAL
BH CV LOWER VASCULAR RIGHT MID FEMORAL PHASIC: NORMAL
BH CV LOWER VASCULAR RIGHT MID FEMORAL SPONT: NORMAL
BH CV LOWER VASCULAR RIGHT PERONEAL AUGMENT: NORMAL
BH CV LOWER VASCULAR RIGHT PERONEAL COMPRESS: NORMAL
BH CV LOWER VASCULAR RIGHT POPLITEAL COMPRESS: NORMAL
BH CV LOWER VASCULAR RIGHT POPLITEAL PHASIC: NORMAL
BH CV LOWER VASCULAR RIGHT POPLITEAL SPONT: NORMAL
BH CV LOWER VASCULAR RIGHT POSTERIOR TIBIAL AUGMENT: NORMAL
BH CV LOWER VASCULAR RIGHT POSTERIOR TIBIAL COMPRESS: NORMAL
BH CV LOWER VASCULAR RIGHT PROFUNDA FEMORAL PHASIC: NORMAL
BH CV LOWER VASCULAR RIGHT PROFUNDA FEMORAL SPONT: NORMAL
BH CV LOWER VASCULAR RIGHT PROXIMAL FEMORAL COMPRESS: NORMAL
BH CV LOWER VASCULAR RIGHT PROXIMAL FEMORAL PHASIC: NORMAL
BH CV LOWER VASCULAR RIGHT PROXIMAL FEMORAL SPONT: NORMAL
BH CV LOWER VASCULAR RIGHT SAPHENOFEMORAL JUNCTION COMPRESS: NORMAL
BH CV LOWER VASCULAR RIGHT SAPHENOFEMORAL JUNCTION PHASIC: NORMAL
BH CV LOWER VASCULAR RIGHT SAPHENOFEMORAL JUNCTION SPONT: NORMAL
BH CV VAS BP LEFT ARM: NORMAL MMHG
BH CV VAS PRELIMINARY FINDINGS SCRIPTING: 1
BH CV XLRA - RV BASE: 4.4 CM
BH CV XLRA - RV LENGTH: 8.9 CM
BH CV XLRA - RV MID: 4.2 CM
BH CV XLRA - TDI S': 12.6 CM/SEC
BILIRUB SERPL-MCNC: 0.3 MG/DL (ref 0–1.2)
BUN SERPL-MCNC: 14 MG/DL (ref 8–23)
BUN/CREAT SERPL: 11.5 (ref 7–25)
CALCIUM SPEC-SCNC: 8.5 MG/DL (ref 8.6–10.5)
CHLORIDE SERPL-SCNC: 110 MMOL/L (ref 98–107)
CO2 SERPL-SCNC: 25 MMOL/L (ref 22–29)
CREAT SERPL-MCNC: 1.22 MG/DL (ref 0.76–1.27)
DEPRECATED RDW RBC AUTO: 41.9 FL (ref 37–54)
EGFRCR SERPLBLD CKD-EPI 2021: 65.8 ML/MIN/1.73
EOSINOPHIL # BLD AUTO: 0.1 10*3/MM3 (ref 0–0.4)
EOSINOPHIL NFR BLD AUTO: 1.5 % (ref 0.3–6.2)
ERYTHROCYTE [DISTWIDTH] IN BLOOD BY AUTOMATED COUNT: 12.9 % (ref 12.3–15.4)
GLOBULIN UR ELPH-MCNC: 2.1 GM/DL
GLUCOSE SERPL-MCNC: 91 MG/DL (ref 65–99)
HCT VFR BLD AUTO: 41.9 % (ref 37.5–51)
HGB BLD-MCNC: 13.6 G/DL (ref 13–17.7)
IMM GRANULOCYTES # BLD AUTO: 0.02 10*3/MM3 (ref 0–0.05)
IMM GRANULOCYTES NFR BLD AUTO: 0.3 % (ref 0–0.5)
LEFT ATRIUM VOLUME INDEX: 39.7 ML/M2
LYMPHOCYTES # BLD AUTO: 1.16 10*3/MM3 (ref 0.7–3.1)
LYMPHOCYTES NFR BLD AUTO: 17.7 % (ref 19.6–45.3)
MAGNESIUM SERPL-MCNC: 2.1 MG/DL (ref 1.6–2.4)
MCH RBC QN AUTO: 28.6 PG (ref 26.6–33)
MCHC RBC AUTO-ENTMCNC: 32.5 G/DL (ref 31.5–35.7)
MCV RBC AUTO: 88 FL (ref 79–97)
MONOCYTES # BLD AUTO: 0.71 10*3/MM3 (ref 0.1–0.9)
MONOCYTES NFR BLD AUTO: 10.8 % (ref 5–12)
NEUTROPHILS NFR BLD AUTO: 4.53 10*3/MM3 (ref 1.7–7)
NEUTROPHILS NFR BLD AUTO: 69.2 % (ref 42.7–76)
NRBC BLD AUTO-RTO: 0 /100 WBC (ref 0–0.2)
PLATELET # BLD AUTO: 240 10*3/MM3 (ref 140–450)
PMV BLD AUTO: 9.1 FL (ref 6–12)
POTASSIUM SERPL-SCNC: 4.6 MMOL/L (ref 3.5–5.2)
PROT SERPL-MCNC: 5.8 G/DL (ref 6–8.5)
RBC # BLD AUTO: 4.76 10*6/MM3 (ref 4.14–5.8)
SODIUM SERPL-SCNC: 143 MMOL/L (ref 136–145)
WBC NRBC COR # BLD: 6.55 10*3/MM3 (ref 3.4–10.8)

## 2023-08-27 PROCEDURE — 25010000002 PROPOFOL 10 MG/ML EMULSION: Performed by: NURSE ANESTHETIST, CERTIFIED REGISTERED

## 2023-08-27 PROCEDURE — 25010000002 DEXAMETHASONE PER 1 MG: Performed by: NURSE ANESTHETIST, CERTIFIED REGISTERED

## 2023-08-27 PROCEDURE — 85025 COMPLETE CBC W/AUTO DIFF WBC: CPT | Performed by: SURGERY

## 2023-08-27 PROCEDURE — 25010000002 ONDANSETRON PER 1 MG: Performed by: NURSE ANESTHETIST, CERTIFIED REGISTERED

## 2023-08-27 PROCEDURE — 83735 ASSAY OF MAGNESIUM: CPT | Performed by: INTERNAL MEDICINE

## 2023-08-27 PROCEDURE — 25010000002 SUGAMMADEX 200 MG/2ML SOLUTION: Performed by: NURSE ANESTHETIST, CERTIFIED REGISTERED

## 2023-08-27 PROCEDURE — 25010000002 CEFAZOLIN IN DEXTROSE 2-4 GM/100ML-% SOLUTION: Performed by: NURSE ANESTHETIST, CERTIFIED REGISTERED

## 2023-08-27 PROCEDURE — 25010000002 FENTANYL CITRATE (PF) 50 MCG/ML SOLUTION

## 2023-08-27 PROCEDURE — 93306 TTE W/DOPPLER COMPLETE: CPT

## 2023-08-27 PROCEDURE — G0378 HOSPITAL OBSERVATION PER HR: HCPCS

## 2023-08-27 PROCEDURE — 25010000002 HEPARIN (PORCINE) PER 1000 UNITS: Performed by: SURGERY

## 2023-08-27 PROCEDURE — 25010000002 HYDROMORPHONE 1 MG/ML SOLUTION

## 2023-08-27 PROCEDURE — 88304 TISSUE EXAM BY PATHOLOGIST: CPT | Performed by: SURGERY

## 2023-08-27 PROCEDURE — 93306 TTE W/DOPPLER COMPLETE: CPT | Performed by: INTERNAL MEDICINE

## 2023-08-27 PROCEDURE — 25010000002 MORPHINE PER 10 MG: Performed by: SURGERY

## 2023-08-27 PROCEDURE — 0FB44ZZ EXCISION OF GALLBLADDER, PERCUTANEOUS ENDOSCOPIC APPROACH: ICD-10-PCS | Performed by: SURGERY

## 2023-08-27 PROCEDURE — 93970 EXTREMITY STUDY: CPT

## 2023-08-27 PROCEDURE — 25010000002 BUPIVACAINE 0.5 % SOLUTION: Performed by: SURGERY

## 2023-08-27 PROCEDURE — 80053 COMPREHEN METABOLIC PANEL: CPT | Performed by: INTERNAL MEDICINE

## 2023-08-27 PROCEDURE — 25010000002 FENTANYL CITRATE (PF) 100 MCG/2ML SOLUTION: Performed by: NURSE ANESTHETIST, CERTIFIED REGISTERED

## 2023-08-27 DEVICE — LIGACLIP 10-M/L, 10MM ENDOSCOPIC ROTATING MULTIPLE CLIP APPLIERS
Type: IMPLANTABLE DEVICE | Site: ABDOMEN | Status: FUNCTIONAL
Brand: LIGACLIP

## 2023-08-27 DEVICE — PBT NON ABSORBABLE WOUND CLOSURE DEVICE
Type: IMPLANTABLE DEVICE | Site: ABDOMEN | Status: FUNCTIONAL
Brand: V-LOC

## 2023-08-27 RX ORDER — ROCURONIUM BROMIDE 10 MG/ML
INJECTION, SOLUTION INTRAVENOUS AS NEEDED
Status: DISCONTINUED | OUTPATIENT
Start: 2023-08-27 | End: 2023-08-27 | Stop reason: SURG

## 2023-08-27 RX ORDER — DEXAMETHASONE SODIUM PHOSPHATE 4 MG/ML
INJECTION, SOLUTION INTRA-ARTICULAR; INTRALESIONAL; INTRAMUSCULAR; INTRAVENOUS; SOFT TISSUE AS NEEDED
Status: DISCONTINUED | OUTPATIENT
Start: 2023-08-27 | End: 2023-08-27 | Stop reason: SURG

## 2023-08-27 RX ORDER — ONDANSETRON 2 MG/ML
INJECTION INTRAMUSCULAR; INTRAVENOUS AS NEEDED
Status: DISCONTINUED | OUTPATIENT
Start: 2023-08-27 | End: 2023-08-27 | Stop reason: SURG

## 2023-08-27 RX ORDER — HYDROMORPHONE HYDROCHLORIDE 1 MG/ML
0.5 INJECTION, SOLUTION INTRAMUSCULAR; INTRAVENOUS; SUBCUTANEOUS
Status: DISCONTINUED | OUTPATIENT
Start: 2023-08-27 | End: 2023-08-27 | Stop reason: HOSPADM

## 2023-08-27 RX ORDER — CEFAZOLIN SODIUM 2 G/100ML
2000 INJECTION, SOLUTION INTRAVENOUS ONCE
Status: DISCONTINUED | OUTPATIENT
Start: 2023-08-27 | End: 2023-08-27 | Stop reason: HOSPADM

## 2023-08-27 RX ORDER — FENTANYL CITRATE 50 UG/ML
INJECTION, SOLUTION INTRAMUSCULAR; INTRAVENOUS
Status: COMPLETED
Start: 2023-08-27 | End: 2023-08-27

## 2023-08-27 RX ORDER — PHENYLEPHRINE HCL IN 0.9% NACL 1 MG/10 ML
SYRINGE (ML) INTRAVENOUS AS NEEDED
Status: DISCONTINUED | OUTPATIENT
Start: 2023-08-27 | End: 2023-08-27 | Stop reason: SURG

## 2023-08-27 RX ORDER — HYDRALAZINE HYDROCHLORIDE 10 MG/1
10 TABLET, FILM COATED ORAL EVERY 6 HOURS PRN
Status: DISCONTINUED | OUTPATIENT
Start: 2023-08-27 | End: 2023-08-30 | Stop reason: HOSPADM

## 2023-08-27 RX ORDER — SODIUM CHLORIDE, SODIUM LACTATE, POTASSIUM CHLORIDE, CALCIUM CHLORIDE 600; 310; 30; 20 MG/100ML; MG/100ML; MG/100ML; MG/100ML
INJECTION, SOLUTION INTRAVENOUS CONTINUOUS PRN
Status: DISCONTINUED | OUTPATIENT
Start: 2023-08-27 | End: 2023-08-27 | Stop reason: SURG

## 2023-08-27 RX ORDER — HEPARIN SODIUM 5000 [USP'U]/ML
5000 INJECTION, SOLUTION INTRAVENOUS; SUBCUTANEOUS EVERY 8 HOURS SCHEDULED
Status: DISCONTINUED | OUTPATIENT
Start: 2023-08-27 | End: 2023-08-28

## 2023-08-27 RX ORDER — SODIUM CHLORIDE 9 MG/ML
40 INJECTION, SOLUTION INTRAVENOUS AS NEEDED
Status: DISCONTINUED | OUTPATIENT
Start: 2023-08-27 | End: 2023-08-27 | Stop reason: HOSPADM

## 2023-08-27 RX ORDER — SODIUM CHLORIDE 0.9 % (FLUSH) 0.9 %
10 SYRINGE (ML) INJECTION EVERY 12 HOURS SCHEDULED
Status: DISCONTINUED | OUTPATIENT
Start: 2023-08-27 | End: 2023-08-27 | Stop reason: HOSPADM

## 2023-08-27 RX ORDER — SODIUM CHLORIDE 0.9 % (FLUSH) 0.9 %
10 SYRINGE (ML) INJECTION AS NEEDED
Status: DISCONTINUED | OUTPATIENT
Start: 2023-08-27 | End: 2023-08-27 | Stop reason: HOSPADM

## 2023-08-27 RX ORDER — CEFAZOLIN SODIUM 2 G/100ML
INJECTION, SOLUTION INTRAVENOUS AS NEEDED
Status: DISCONTINUED | OUTPATIENT
Start: 2023-08-27 | End: 2023-08-27 | Stop reason: SURG

## 2023-08-27 RX ORDER — SODIUM CHLORIDE 9 MG/ML
9 INJECTION, SOLUTION INTRAVENOUS CONTINUOUS PRN
Status: DISCONTINUED | OUTPATIENT
Start: 2023-08-27 | End: 2023-08-30 | Stop reason: HOSPADM

## 2023-08-27 RX ORDER — EPHEDRINE SULFATE 50 MG/ML
INJECTION INTRAVENOUS AS NEEDED
Status: DISCONTINUED | OUTPATIENT
Start: 2023-08-27 | End: 2023-08-27 | Stop reason: SURG

## 2023-08-27 RX ORDER — MIDAZOLAM HYDROCHLORIDE 1 MG/ML
0.5 INJECTION INTRAMUSCULAR; INTRAVENOUS
Status: DISCONTINUED | OUTPATIENT
Start: 2023-08-27 | End: 2023-08-27 | Stop reason: HOSPADM

## 2023-08-27 RX ORDER — LIDOCAINE HYDROCHLORIDE 10 MG/ML
INJECTION, SOLUTION EPIDURAL; INFILTRATION; INTRACAUDAL; PERINEURAL AS NEEDED
Status: DISCONTINUED | OUTPATIENT
Start: 2023-08-27 | End: 2023-08-27 | Stop reason: SURG

## 2023-08-27 RX ORDER — ONDANSETRON 2 MG/ML
4 INJECTION INTRAMUSCULAR; INTRAVENOUS ONCE AS NEEDED
Status: DISCONTINUED | OUTPATIENT
Start: 2023-08-27 | End: 2023-08-27 | Stop reason: HOSPADM

## 2023-08-27 RX ORDER — PROPOFOL 10 MG/ML
VIAL (ML) INTRAVENOUS AS NEEDED
Status: DISCONTINUED | OUTPATIENT
Start: 2023-08-27 | End: 2023-08-27 | Stop reason: SURG

## 2023-08-27 RX ORDER — SODIUM CHLORIDE 9 MG/ML
INJECTION, SOLUTION INTRAVENOUS AS NEEDED
Status: DISCONTINUED | OUTPATIENT
Start: 2023-08-27 | End: 2023-08-27 | Stop reason: HOSPADM

## 2023-08-27 RX ORDER — FENTANYL CITRATE 50 UG/ML
50 INJECTION, SOLUTION INTRAMUSCULAR; INTRAVENOUS
Status: DISCONTINUED | OUTPATIENT
Start: 2023-08-27 | End: 2023-08-27 | Stop reason: HOSPADM

## 2023-08-27 RX ORDER — GLYCOPYRROLATE 0.2 MG/ML
INJECTION INTRAMUSCULAR; INTRAVENOUS AS NEEDED
Status: DISCONTINUED | OUTPATIENT
Start: 2023-08-27 | End: 2023-08-27 | Stop reason: SURG

## 2023-08-27 RX ORDER — MAGNESIUM HYDROXIDE 1200 MG/15ML
LIQUID ORAL AS NEEDED
Status: DISCONTINUED | OUTPATIENT
Start: 2023-08-27 | End: 2023-08-27 | Stop reason: HOSPADM

## 2023-08-27 RX ORDER — FENTANYL CITRATE 50 UG/ML
INJECTION, SOLUTION INTRAMUSCULAR; INTRAVENOUS AS NEEDED
Status: DISCONTINUED | OUTPATIENT
Start: 2023-08-27 | End: 2023-08-27 | Stop reason: SURG

## 2023-08-27 RX ORDER — BUPIVACAINE HYDROCHLORIDE 5 MG/ML
INJECTION, SOLUTION PERINEURAL AS NEEDED
Status: DISCONTINUED | OUTPATIENT
Start: 2023-08-27 | End: 2023-08-27 | Stop reason: HOSPADM

## 2023-08-27 RX ADMIN — ROCURONIUM BROMIDE 20 MG: 10 SOLUTION INTRAVENOUS at 12:09

## 2023-08-27 RX ADMIN — SODIUM CHLORIDE 9 ML/HR: 9 INJECTION, SOLUTION INTRAVENOUS at 10:20

## 2023-08-27 RX ADMIN — SUGAMMADEX 200 MG: 100 INJECTION, SOLUTION INTRAVENOUS at 13:37

## 2023-08-27 RX ADMIN — HEPARIN SODIUM 5000 UNITS: 5000 INJECTION INTRAVENOUS; SUBCUTANEOUS at 21:04

## 2023-08-27 RX ADMIN — ROCURONIUM BROMIDE 50 MG: 10 SOLUTION INTRAVENOUS at 11:13

## 2023-08-27 RX ADMIN — HYDROCODONE BITARTRATE AND ACETAMINOPHEN 1 TABLET: 5; 325 TABLET ORAL at 17:22

## 2023-08-27 RX ADMIN — HYDROMORPHONE HYDROCHLORIDE 0.5 MG: 1 INJECTION, SOLUTION INTRAMUSCULAR; INTRAVENOUS; SUBCUTANEOUS at 14:10

## 2023-08-27 RX ADMIN — MORPHINE SULFATE 2 MG: 2 INJECTION, SOLUTION INTRAMUSCULAR; INTRAVENOUS at 20:12

## 2023-08-27 RX ADMIN — PANTOPRAZOLE SODIUM 40 MG: 40 TABLET, DELAYED RELEASE ORAL at 08:40

## 2023-08-27 RX ADMIN — HYDROCODONE BITARTRATE AND ACETAMINOPHEN 1 TABLET: 5; 325 TABLET ORAL at 03:11

## 2023-08-27 RX ADMIN — Medication 100 MCG: at 11:33

## 2023-08-27 RX ADMIN — FENTANYL CITRATE 100 MCG: 50 INJECTION, SOLUTION INTRAMUSCULAR; INTRAVENOUS at 11:13

## 2023-08-27 RX ADMIN — SODIUM CHLORIDE, POTASSIUM CHLORIDE, SODIUM LACTATE AND CALCIUM CHLORIDE: 600; 310; 30; 20 INJECTION, SOLUTION INTRAVENOUS at 13:11

## 2023-08-27 RX ADMIN — PROPOFOL 200 MG: 10 INJECTION, EMULSION INTRAVENOUS at 11:13

## 2023-08-27 RX ADMIN — DEXAMETHASONE SODIUM PHOSPHATE 4 MG: 4 INJECTION, SOLUTION INTRAMUSCULAR; INTRAVENOUS at 11:17

## 2023-08-27 RX ADMIN — LIDOCAINE HYDROCHLORIDE 50 MG: 10 INJECTION, SOLUTION EPIDURAL; INFILTRATION; INTRACAUDAL; PERINEURAL at 11:13

## 2023-08-27 RX ADMIN — GLYCOPYRROLATE 0.4 MG: 0.4 INJECTION INTRAMUSCULAR; INTRAVENOUS at 11:25

## 2023-08-27 RX ADMIN — FENTANYL CITRATE 50 MCG: 50 INJECTION, SOLUTION INTRAMUSCULAR; INTRAVENOUS at 13:57

## 2023-08-27 RX ADMIN — CEFAZOLIN SODIUM 2 G: 2 INJECTION, SOLUTION INTRAVENOUS at 11:17

## 2023-08-27 RX ADMIN — EPHEDRINE SULFATE 5 MG: 50 INJECTION INTRAVENOUS at 11:29

## 2023-08-27 RX ADMIN — HYDROCODONE BITARTRATE AND ACETAMINOPHEN 1 TABLET: 5; 325 TABLET ORAL at 21:55

## 2023-08-27 RX ADMIN — SODIUM CHLORIDE, POTASSIUM CHLORIDE, SODIUM LACTATE AND CALCIUM CHLORIDE: 600; 310; 30; 20 INJECTION, SOLUTION INTRAVENOUS at 12:09

## 2023-08-27 RX ADMIN — SODIUM CHLORIDE, POTASSIUM CHLORIDE, SODIUM LACTATE AND CALCIUM CHLORIDE: 600; 310; 30; 20 INJECTION, SOLUTION INTRAVENOUS at 11:07

## 2023-08-27 RX ADMIN — ROCURONIUM BROMIDE 30 MG: 10 SOLUTION INTRAVENOUS at 12:46

## 2023-08-27 RX ADMIN — MORPHINE SULFATE 2 MG: 2 INJECTION, SOLUTION INTRAMUSCULAR; INTRAVENOUS at 23:13

## 2023-08-27 RX ADMIN — AMLODIPINE BESYLATE 5 MG: 5 TABLET ORAL at 08:40

## 2023-08-27 RX ADMIN — Medication 200 MCG: at 12:04

## 2023-08-27 RX ADMIN — MORPHINE SULFATE 2 MG: 2 INJECTION, SOLUTION INTRAMUSCULAR; INTRAVENOUS at 15:21

## 2023-08-27 RX ADMIN — ONDANSETRON 4 MG: 2 INJECTION INTRAMUSCULAR; INTRAVENOUS at 13:04

## 2023-08-27 RX ADMIN — Medication 200 MCG: at 12:19

## 2023-08-27 RX ADMIN — HEPARIN SODIUM 5000 UNITS: 5000 INJECTION INTRAVENOUS; SUBCUTANEOUS at 15:21

## 2023-08-27 RX ADMIN — Medication 200 MCG: at 13:04

## 2023-08-27 NOTE — ANESTHESIA PROCEDURE NOTES
Airway  Urgency: elective    Date/Time: 8/27/2023 11:15 AM  Airway not difficult    General Information and Staff    Patient location during procedure: OR  CRNA/CAA: Alvino Hallman CRNA    Indications and Patient Condition  Indications for airway management: airway protection    Preoxygenated: yes  MILS not maintained throughout  Mask difficulty assessment: 2 - vent by mask + OA or adjuvant +/- NMBA    Final Airway Details  Final airway type: endotracheal airway      Successful airway: ETT  Cuffed: yes   Successful intubation technique: direct laryngoscopy  Facilitating devices/methods: intubating stylet  Endotracheal tube insertion site: oral  Blade: Lee  Blade size: 2  ETT size (mm): 7.0  Cormack-Lehane Classification: grade I - full view of glottis  Placement verified by: chest auscultation and capnometry   Cuff volume (mL): 8  Measured from: lips  ETT/EBT  to lips (cm): 21  Number of attempts at approach: 1  Assessment: lips, teeth, and gum same as pre-op and atraumatic intubation    Additional Comments  Negative epigastric sounds, Breath sound equal bilaterally with symmetric chest rise and fall

## 2023-08-27 NOTE — OP NOTE
General Surgery Operative Note    Juan Suero  [id number]  1960    Date of Surgery:  8/27/2023 13:49 EDT    Pre-op Diagnosis: Chronic calculus cholecystitis    Post-op Diagnosis: Acute on chronic cholecystitis    Procedure: Laparoscopic cholecystectomy    Surgeon: Lai Cuadra MD    Anesthesia: General    Fluids: 2 L of crystalloid    Estimated Blood Loss: Less than 25 mL    Urine Voided: Not recorded     Specimens: Gallbladder                  Drains: 19 [place] Macario drain right upper quadrant, gallbladder fossa    Findings: Acute on chronic cholecystitis with severe dense inflammatory reaction at the level of the gallbladder neck and cystic duct    Complications: None apparent    History: 63-year-old gentleman presented to the emergency room with persistent right upper quadrant abdominal pain.  He has had symptoms x5 weeks worsening over the last 2 weeks.  I reviewed his imaging.       I rehashed the risks and benefits of cholecystectomy, including but not limited to: bleeding, infection, injury to adjacent viscera (small bowel, large bowel, duodenum, common bile duct, hepatic artery, the liver etc.), bile leak/biloma formation, retained stones, need for ERCP, an open operation and general, and medical issues from a cardiopulmonary venous thrombosis standpoint.    Procedure:      After informed consent, the patient was taken to the operating room.  They were placed in the supine position on the operating table, general anesthesia administered, and the abdomen was then prepped and draped in the standard sterile fashion. A time out was performed.      The operation began with a periumbilical Gaby trocar cutdown.  The skin was anesthetized and incised, the peritoneum entered sharply under direct visualization, bilateral 0 Vicryl fascial sutures were placed, and the blunt-tipped Gaby trocar was placed intra-abdominally.  The abdomen was insufflated and the patient was placed in the head up position  and rolled slightly onto the left hand side.  I then placed a subxiphoid port and two 5 mm trocars in the right upper quadrant under direct visualization.     The gallbladder appeared to have acute on chronic inflammation.  The gallbladder was contracted around large stones and unable to be grasped directly.  I attempted to decompress the gallbladder with a laparoscopic needle though as this was contracted around stones this was unable to be done.  The fundus of the gallbladder was and retracted cephalad up over the liver edge.  The dense attachments to the surrounding omentum were taken down with the [place] LigaSure.  The stones within the gallbladder were quite firm down to the neck of the gallbladder and stuck in place.  A slow meticulous dissection around the neck of the gallbladder took place.  A small vein was singly clipped secondary to bleeding.  At this point secondary to the dense inflammatory reaction I felt that a partial cholecystectomy at the level of the neck of the gallbladder was my safest option at this point.  The front wall of the gallbladder was opened to remove the stones from the gallbladder lumen, this was quite difficult secondary to the overall inflammatory reaction.  I was able to remove the stones in the neck and the [place] LigaSure was used to dissect the gallbladder wall circumferentially from the liver edge.  I then oversewed the neck of the gallbladder with a 2 oh V-Loc suture.  The remaining portion of the gallbladder removed from the gallbladder bed with the electrocautery Bovie.  The stones and remaining portion of the gallbladder were placed in an Endo Catch bag and withdrawn from the abdomen the right upper quadrant was copiously irrigated removing any remaining fragments of stones.  I placed a 19 [place] Macario drain through the right lateral 5 mm trocar site and sewed this into place with a 2-0 nylon.  This was placed to bulb suction.  I reinspected my oversewn gallbladder  neck and reinforced this with a small piece of adipose tissue from the surrounding tissue and this was oversewn with a 3-0 silk suture.  The gallbladder fossa appeared in good order.  I inspected my umbilical port site and this was without obvious complication.  All trocars were removed under direct visualization without bleeding.  The periumbilical fascial defect was closed with 0 Vicryl.  All skin incisions were closed with 4-0 Monocryl, Mastisol, Steri-Strips, Telfa, and Tegaderms.       The lap and needle count was reported as correct at the end of the procedure ×2. The patient was then transferred to the recovery room in stable condition.     Lai Cuadra MD     Date: 8/27/2023  Time: 13:49 EDT

## 2023-08-27 NOTE — PROGRESS NOTES
[facility] Medicine Services  PROGRESS NOTE    Patient Name: Juan Suero  : 1960  MRN: [id number]    Date of Admission: 2023  Primary Care Physician: Provider, No Known    Subjective   Subjective     CC:  Gallstones, a/c cholecystitis, PE    HPI:  Seen before surgery  No dyspnea  No palpitations  Ruq pain w/ deep inspiration    ROS:  No f/c  No dysuria    Objective   Objective     Vital Signs:   Temp:  [97.1 °F (36.2 °C)-98.3 °F (36.8 °C)] 97.1 °F (36.2 °C)  Heart Rate:  [40-65] 50  Resp:  [16-18] 18  BP: (133-187)/(62-99) 181/82     Physical Exam:  Constitutional:Alert, oriented x 3, nontoxic appearing  Psych:Normal/appropriate affect  HEENT:NCAT, oropharynx clear  Neck: neck supple, full range of motion  Neuro: Face symmetric, speech clear, equal , moves all extremities  Cardiac: RRR; No pretibial pitting edema  Resp: CTAB, normal effort  GI: abd soft, ruq tenderness, no rebound  Skin: No extremity rash  Musculoskeletal/extremities: no cyanosis of extremities; no significant ankle edema      Results Reviewed:  LAB RESULTS:      Lab 23  0510 23  1038   WBC 6.55  --  7.64   HEMOGLOBIN 13.6  --  14.6   HEMATOCRIT 41.9  --  44.8   PLATELETS 240  --  294   NEUTROS ABS 4.53  --  6.10   IMMATURE GRANS (ABS) 0.02  --  0.03   LYMPHS ABS 1.16  --  0.75   MONOS ABS 0.71  --  0.68   EOS ABS 0.10  --  0.05   MCV 88.0  --  88.4   LACTATE  --   --  1.3   HEPARIN ANTI-XA  --  0.47  --          Lab 23  0510 23  1038   SODIUM 143 142   POTASSIUM 4.6 4.3   CHLORIDE 110* 109*   CO2 25.0 23.0   ANION GAP 8.0 10.0   BUN 14 17   CREATININE 1.22 1.28*   EGFR 65.8 62.1   GLUCOSE 91 112*   CALCIUM 8.5* 8.7   MAGNESIUM 2.1  --          Lab 23  0510 23  1038   TOTAL PROTEIN 5.8* 6.7   ALBUMIN 3.7 4.0   GLOBULIN 2.1 2.7   ALT (SGPT) 20 22   AST (SGOT) 16 18   BILIRUBIN 0.3 0.4   ALK PHOS 80 86   LIPASE  --  51         Lab 23  3372  08/26/23  1038   PROBNP  --  97.5   HSTROP T 10 10                 Brief Urine Lab Results  (Last result in the past 365 days)        Color   Clarity   Blood   Leuk Est   Nitrite   Protein   CREAT   Urine HCG        08/26/23 1043 Yellow   Clear   Negative   Negative   Negative   Negative                   Microbiology Results Abnormal       None            Adult Transthoracic Echo Complete W/ Cont if Necessary Per Protocol    Result Date: 8/27/2023    Left ventricular ejection fraction appears to be 56 - 60%.   The left ventricular cavity is dilated.   The right ventricular cavity is mildly dilated.   The left atrial cavity is mildly dilated.   The right atrial cavity is mildly  dilated.   Estimated right ventricular systolic pressure from tricuspid regurgitation is normal (<35 mmHg).     CT Abdomen Pelvis With Contrast    Result Date: 8/26/2023  CT ANGIOGRAM CHEST, CT ABDOMEN PELVIS W CONTRAST Date of Exam: 8/26/2023 12:49 PM EDT Indication: PE. HISTORY OF HISTO.. Comparison: None available. Technique: CTA of the chest abdomen and pelvis was performed after the uneventful intravenous administration of 85 mL Isovue-370. Reconstructed coronal and sagittal images were also obtained. In addition, a 3-D volume rendered image was created for interpretation. Automated exposure control and iterative reconstruction methods were used. Findings: Chest: The central tracheobronchial tree is clear. A couple benign calcified granulomas are noted. There is no focal consolidation. There is no pleural effusion. The heart is enlarged. The great vessels are normal in caliber. There are several pulmonary emboli within left upper and right lower lobe segmental branches. There is no evidence of acute right heart strain. No abnormally enlarged lymph nodes are identified. No aggressive osseous lesions are identified. Abdomen/pelvis: The liver, adrenal glands, left kidney, spleen, and pancreas are unremarkable. There are bulky stones in the  gallbladder. There is a small right renal cyst. The stomach appears normal. The small bowel appears normal in caliber and configuration. The colon appears normal. The appendix appears normal. There is no ascites or loculated collection. No abnormally enlarged lymph nodes are identified. The rectum, prostate, and urinary bladder are unremarkable. No aggressive osseous lesions are identified.     Impression: Impression: 1.Several pulmonary emboli within left upper and right lower lobe segmental branches. 2.Lungs are clear. 3.Cardiomegaly. 4.No acute process identified within abdomen/pelvis 5.Cholelithiasis. Results were discussed with DONNA Wiley at time of dictation. Electronically Signed: Jonathon Laws MD  8/26/2023 1:20 PM EDT  Workstation ID: [id number]    CT Angiogram Chest    Result Date: 8/26/2023  CT ANGIOGRAM CHEST, CT ABDOMEN PELVIS W CONTRAST Date of Exam: 8/26/2023 12:49 PM EDT Indication: PE. HISTORY OF HISTO.. Comparison: None available. Technique: CTA of the chest abdomen and pelvis was performed after the uneventful intravenous administration of 85 mL Isovue-370. Reconstructed coronal and sagittal images were also obtained. In addition, a 3-D volume rendered image was created for interpretation. Automated exposure control and iterative reconstruction methods were used. Findings: Chest: The central tracheobronchial tree is clear. A couple benign calcified granulomas are noted. There is no focal consolidation. There is no pleural effusion. The heart is enlarged. The great vessels are normal in caliber. There are several pulmonary emboli within left upper and right lower lobe segmental branches. There is no evidence of acute right heart strain. No abnormally enlarged lymph nodes are identified. No aggressive osseous lesions are identified. Abdomen/pelvis: The liver, adrenal glands, left kidney, spleen, and pancreas are unremarkable. There are bulky stones in the gallbladder. There is a small right  renal cyst. The stomach appears normal. The small bowel appears normal in caliber and configuration. The colon appears normal. The appendix appears normal. There is no ascites or loculated collection. No abnormally enlarged lymph nodes are identified. The rectum, prostate, and urinary bladder are unremarkable. No aggressive osseous lesions are identified.     Impression: Impression: 1.Several pulmonary emboli within left upper and right lower lobe segmental branches. 2.Lungs are clear. 3.Cardiomegaly. 4.No acute process identified within abdomen/pelvis 5.Cholelithiasis. Results were discussed with DONNA Wiley at time of dictation. Electronically Signed: Jonathon Laws MD  8/26/2023 1:20 PM EDT  Workstation ID: [id number]     Results for orders placed during the hospital encounter of 08/26/23    Adult Transthoracic Echo Complete W/ Cont if Necessary Per Protocol    Interpretation Summary    Left ventricular ejection fraction appears to be 56 - 60%.    The left ventricular cavity is dilated.    The right ventricular cavity is mildly dilated.    The left atrial cavity is mildly dilated.    The right atrial cavity is mildly  dilated.    Estimated right ventricular systolic pressure from tricuspid regurgitation is normal (<35 mmHg).      Current medications:  Scheduled Meds:amLODIPine, 5 mg, Oral, Daily  fentaNYL citrate (PF), , ,   [MAR Hold] pantoprazole, 40 mg, Oral, Daily      Continuous Infusions:Pharmacy to Dose Heparin,   sodium chloride, 9 mL/hr, Last Rate: 9 mL/hr (08/27/23 1020)      PRN Meds:.  fentanyl    hydrALAZINE    [MAR Hold] HYDROcodone-acetaminophen    HYDROmorphone    lactated ringers    Magnesium Standard Dose Replacement - Follow Nurse / BPA Driven Protocol    [MAR Hold] Morphine    [MAR Hold] ondansetron    ondansetron    Pharmacy to Dose Heparin    [MAR Hold] Sodium Chloride (PF)    sodium chloride    Assessment & Plan   Assessment & Plan     Active Hospital Problems    Diagnosis  POA     **Pulmonary embolism [I26.99]  Yes    Gallstones [K80.20]  Yes    Essential hypertension [I10]  Yes    Obesity (BMI 30-39.9) [E66.9]  Unknown    Hyperlipidemia [E78.5]  Yes      Resolved Hospital Problems   No resolved problems to display.        Brief Hospital Course to date:  Juan Suero is a 63 y.o. male w/ hx htn, hl who presented with 3 day sof ruq pain, somewhat worsened after eating and sometimes w/ pleurisy. Ct angio chest revealed TARIK and RLL segmental PE's (no strain). Incidentally noted gallstones. Lipase, lft's normal. Initiated on heparin drip. Dr. Cuadra of gen surgery consulted. Echo was normal. Venous duplex showed LLE calf dvt. Heparin drip was held, went for lap ccy on 8/27/23 showing acute on chronic cholecystitis.      Bilateral, segmental Pulmonary emboli (without heart strain, without hypoxia)  LLE DVT  -likely due to immobility due to frequent traveling via planes and car to [place]  -CT angio chest: several pulmonary emboli within left upper and right lower segmental branches (lungs clear; incidental gallstones)  -echo normal  -BLE venous duplex preliminary: LLE calf dvt  -initiated on heparin drip evening 8/26/23; heparin drip stopped for ccy 8/27/23  -post-operatively surgery is holding full dose heparin drip overnight, instead placing on dvt prophylaxis dose sq heparin overnight; may require further intervention/gi consultation  -restart full anticoagulation when ok w/ surgery (see below)    Acute on chronic cholecystitis  -s/p lap ccy 8/27/23; discussed w/ Dr. Cuadra post-operatively, keeping off of heparin drip tonight but placing on dvt prophylaxis dose heparin. Patient may require GI consultation tomorrow, he will assess in the morning    HTN  Bradycardia  -holding atenolol  -continue amlodipine    CKD (baseline cr ~1.3)    Am labs: cbc, cmp, mag    Expected Discharge Location and Transportation: home  Expected Discharge 1-2 days when cleared by surgery post-operatively and tolerating  anticoagulation      DVT prophylaxis:  Medical DVT prophylaxis orders are present.     AM-PAC 6 Clicks Score (PT): 24 (08/27/23 0800)    CODE STATUS:   There are no questions and answers to display.       Kenny Kolb MD  08/27/23

## 2023-08-27 NOTE — PLAN OF CARE
Goal Outcome Evaluation:  Plan of Care Reviewed With: patient        Progress: no change  Outcome Evaluation: Heparin gtt stopped at midnight. NPO since midnight. PRN given for a complaint of a headache. Room air. VSS. Voiding adequately. No other concerns at this time. Will continue with the plan of care.

## 2023-08-27 NOTE — ANESTHESIA POSTPROCEDURE EVALUATION
Patient: Juan Suero    Procedure Summary       Date: 08/27/23 Room / [facility]:  [facility]    Anesthesia Start: 1107 Anesthesia Stop: 1400    Procedure: CHOLECYSTECTOMY LAPAROSCOPIC (Abdomen) Diagnosis:     Surgeons: Lai Cuadra MD Provider: Jonathon Savage MD    Anesthesia Type: general ASA Status: 3            Anesthesia Type: general    Vitals  Vitals Value Taken Time   BP     Temp     Pulse 88 08/27/23 1400   Resp     SpO2 92 % 08/27/23 1400   Vitals shown include unvalidated device data.        Post Anesthesia Care and Evaluation    Patient location during evaluation: PACU  Patient participation: complete - patient participated  Level of consciousness: awake and alert  Pain management: adequate    Airway patency: patent  Anesthetic complications: No anesthetic complications  PONV Status: none  Cardiovascular status: hemodynamically stable and acceptable  Respiratory status: nonlabored ventilation, acceptable and nasal cannula  Hydration status: acceptable

## 2023-08-27 NOTE — PLAN OF CARE
Goal Outcome Evaluation:  Plan of Care Reviewed With: patient        Progress: no change  Outcome Evaluation: VSS. RA to 2L NC. Pt back from PACU. Alert and oriented x4. NSR to sinus bradycardia on the monitor. Pt complaints of post-op pain. PRN's given for pain control. No complaints of nausea. Daughter at bedside. Lap sites clean and dry with no drainage. RLQ CORNELIA drain in place with bloody drainage. Subq heparin given. Pt resting comfortably. No further complaints at this time.

## 2023-08-28 LAB
ALBUMIN SERPL-MCNC: 3.8 G/DL (ref 3.5–5.2)
ALBUMIN/GLOB SERPL: 1.6 G/DL
ALP SERPL-CCNC: 76 U/L (ref 39–117)
ALT SERPL W P-5'-P-CCNC: 44 U/L (ref 1–41)
ANION GAP SERPL CALCULATED.3IONS-SCNC: 10 MMOL/L (ref 5–15)
APTT PPP: 32.8 SECONDS (ref 60–90)
AST SERPL-CCNC: 42 U/L (ref 1–40)
BILIRUB SERPL-MCNC: 0.6 MG/DL (ref 0–1.2)
BUN SERPL-MCNC: 14 MG/DL (ref 8–23)
BUN/CREAT SERPL: 12.4 (ref 7–25)
CALCIUM SPEC-SCNC: 8.3 MG/DL (ref 8.6–10.5)
CHLORIDE SERPL-SCNC: 103 MMOL/L (ref 98–107)
CO2 SERPL-SCNC: 24 MMOL/L (ref 22–29)
CREAT SERPL-MCNC: 1.13 MG/DL (ref 0.76–1.27)
DEPRECATED RDW RBC AUTO: 42.2 FL (ref 37–54)
EGFRCR SERPLBLD CKD-EPI 2021: 72.1 ML/MIN/1.73
ERYTHROCYTE [DISTWIDTH] IN BLOOD BY AUTOMATED COUNT: 13 % (ref 12.3–15.4)
GLOBULIN UR ELPH-MCNC: 2.4 GM/DL
GLUCOSE SERPL-MCNC: 144 MG/DL (ref 65–99)
HCT VFR BLD AUTO: 41.3 % (ref 37.5–51)
HGB BLD-MCNC: 13.4 G/DL (ref 13–17.7)
INR PPP: 1.18 (ref 0.89–1.12)
MAGNESIUM SERPL-MCNC: 2 MG/DL (ref 1.6–2.4)
MCH RBC QN AUTO: 28.9 PG (ref 26.6–33)
MCHC RBC AUTO-ENTMCNC: 32.4 G/DL (ref 31.5–35.7)
MCV RBC AUTO: 89 FL (ref 79–97)
MRSA DNA SPEC QL NAA+PROBE: NEGATIVE
PLATELET # BLD AUTO: 277 10*3/MM3 (ref 140–450)
PMV BLD AUTO: 10.2 FL (ref 6–12)
POTASSIUM SERPL-SCNC: 4.1 MMOL/L (ref 3.5–5.2)
PROT SERPL-MCNC: 6.2 G/DL (ref 6–8.5)
PROTHROMBIN TIME: 15.1 SECONDS (ref 12.2–14.5)
RBC # BLD AUTO: 4.64 10*6/MM3 (ref 4.14–5.8)
SODIUM SERPL-SCNC: 137 MMOL/L (ref 136–145)
UFH PPP CHRO-ACNC: 0.1 IU/ML (ref 0.3–0.7)
UFH PPP CHRO-ACNC: 0.27 IU/ML (ref 0.3–0.7)
WBC NRBC COR # BLD: 13.18 10*3/MM3 (ref 3.4–10.8)

## 2023-08-28 PROCEDURE — 80053 COMPREHEN METABOLIC PANEL: CPT | Performed by: INTERNAL MEDICINE

## 2023-08-28 PROCEDURE — 85027 COMPLETE CBC AUTOMATED: CPT | Performed by: INTERNAL MEDICINE

## 2023-08-28 PROCEDURE — 25010000002 PIPERACILLIN SOD-TAZOBACTAM PER 1 G: Performed by: SURGERY

## 2023-08-28 PROCEDURE — 85730 THROMBOPLASTIN TIME PARTIAL: CPT | Performed by: SURGERY

## 2023-08-28 PROCEDURE — 25010000002 HEPARIN (PORCINE) PER 1000 UNITS: Performed by: SURGERY

## 2023-08-28 PROCEDURE — 85520 HEPARIN ASSAY: CPT | Performed by: SURGERY

## 2023-08-28 PROCEDURE — G0378 HOSPITAL OBSERVATION PER HR: HCPCS

## 2023-08-28 PROCEDURE — 25010000002 MORPHINE PER 10 MG: Performed by: SURGERY

## 2023-08-28 PROCEDURE — 25010000002 HEPARIN (PORCINE) 25000-0.45 UT/250ML-% SOLUTION: Performed by: SURGERY

## 2023-08-28 PROCEDURE — 85610 PROTHROMBIN TIME: CPT | Performed by: SURGERY

## 2023-08-28 PROCEDURE — 83735 ASSAY OF MAGNESIUM: CPT | Performed by: INTERNAL MEDICINE

## 2023-08-28 PROCEDURE — 85520 HEPARIN ASSAY: CPT

## 2023-08-28 PROCEDURE — 87641 MR-STAPH DNA AMP PROBE: CPT

## 2023-08-28 RX ORDER — LOSARTAN POTASSIUM 50 MG/1
100 TABLET ORAL
Status: DISCONTINUED | OUTPATIENT
Start: 2023-08-28 | End: 2023-08-30 | Stop reason: HOSPADM

## 2023-08-28 RX ORDER — HEPARIN SODIUM 10000 [USP'U]/100ML
17.5 INJECTION, SOLUTION INTRAVENOUS
Status: DISCONTINUED | OUTPATIENT
Start: 2023-08-28 | End: 2023-08-30

## 2023-08-28 RX ORDER — IBUPROFEN 400 MG/1
400 TABLET ORAL EVERY 6 HOURS PRN
Status: DISCONTINUED | OUTPATIENT
Start: 2023-08-28 | End: 2023-08-29

## 2023-08-28 RX ORDER — HEPARIN SODIUM 10000 [USP'U]/100ML
13 INJECTION, SOLUTION INTRAVENOUS
Status: DISCONTINUED | OUTPATIENT
Start: 2023-08-28 | End: 2023-08-28

## 2023-08-28 RX ADMIN — HYDROCODONE BITARTRATE AND ACETAMINOPHEN 1 TABLET: 5; 325 TABLET ORAL at 08:17

## 2023-08-28 RX ADMIN — PANTOPRAZOLE SODIUM 40 MG: 40 TABLET, DELAYED RELEASE ORAL at 08:18

## 2023-08-28 RX ADMIN — PIPERACILLIN SODIUM AND TAZOBACTAM SODIUM 3.38 G: 3; .375 INJECTION, SOLUTION INTRAVENOUS at 10:54

## 2023-08-28 RX ADMIN — HYDROCODONE BITARTRATE AND ACETAMINOPHEN 1 TABLET: 5; 325 TABLET ORAL at 02:20

## 2023-08-28 RX ADMIN — HEPARIN SODIUM 13 UNITS/KG/HR: 10000 INJECTION, SOLUTION INTRAVENOUS at 10:54

## 2023-08-28 RX ADMIN — AMLODIPINE BESYLATE 5 MG: 5 TABLET ORAL at 08:18

## 2023-08-28 RX ADMIN — PIPERACILLIN SODIUM AND TAZOBACTAM SODIUM 3.38 G: 3; .375 INJECTION, SOLUTION INTRAVENOUS at 17:46

## 2023-08-28 RX ADMIN — MORPHINE SULFATE 2 MG: 2 INJECTION, SOLUTION INTRAMUSCULAR; INTRAVENOUS at 14:07

## 2023-08-28 RX ADMIN — LOSARTAN POTASSIUM 100 MG: 50 TABLET, FILM COATED ORAL at 08:16

## 2023-08-28 RX ADMIN — HYDROCODONE BITARTRATE AND ACETAMINOPHEN 1 TABLET: 5; 325 TABLET ORAL at 21:52

## 2023-08-28 RX ADMIN — HYDROCODONE BITARTRATE AND ACETAMINOPHEN 1 TABLET: 5; 325 TABLET ORAL at 16:37

## 2023-08-28 RX ADMIN — MORPHINE SULFATE 2 MG: 2 INJECTION, SOLUTION INTRAMUSCULAR; INTRAVENOUS at 05:44

## 2023-08-28 RX ADMIN — HYDROCODONE BITARTRATE AND ACETAMINOPHEN 1 TABLET: 5; 325 TABLET ORAL at 12:19

## 2023-08-28 RX ADMIN — MORPHINE SULFATE 2 MG: 2 INJECTION, SOLUTION INTRAMUSCULAR; INTRAVENOUS at 18:49

## 2023-08-28 RX ADMIN — HEPARIN SODIUM 5000 UNITS: 5000 INJECTION INTRAVENOUS; SUBCUTANEOUS at 05:38

## 2023-08-28 NOTE — CASE MANAGEMENT/SOCIAL WORK
Discharge Planning Assessment  [facility]     Patient Name: Juan Suero  MRN: [id number]  Today's Date: 8/28/2023    Admit Date: 8/26/2023    Plan: MARIA ESTHER Eval   Discharge Needs Assessment       Row Name 08/28/23 1720       Living Environment    People in Home spouse    Current Living Arrangements home    Potentially Unsafe Housing Conditions none    Primary Care Provided by self    Provides Primary Care For no one    Family Caregiver if Needed spouse    Quality of Family Relationships helpful    Able to Return to Prior Arrangements yes       Resource/Environmental Concerns    Resource/Environmental Concerns none       Transition Planning    Patient/Family Anticipates Transition to home with family;home    Patient/Family Anticipated Services at Transition     Transportation Anticipated family or friend will provide       Discharge Needs Assessment    Readmission Within the Last 30 Days no previous admission in last 30 days    Equipment Currently Used at Home none    Concerns to be Addressed discharge planning    Anticipated Changes Related to Illness none    Equipment Needed After Discharge none                   Discharge Plan       Row Name 08/28/23 1718       Plan    Plan CM Eval    Plan Comments Confirmed patient lives with his spouse - they have a home in [place] and have been traveling here as well with plans to move to [place]. He is independent of ADLs and does not use DME at baseline. Underwent a lap lona and has heparin gtt as well for bilateral PEs. Goal is to return home upon discharge- CM will continue to follow for discharge planning needs that may arise- elida [phone]    Final Discharge Disposition Code 01 - home or self-care                  Continued Care and Services - Admitted Since 8/26/2023    Coordination has not been started for this encounter.       Expected Discharge Date and Time       Expected Discharge Date Expected Discharge Time    Aug 27, 2023            Demographic Summary        Row Name 08/28/23 1720       General Information    Admission Type inpatient    Arrived From home    Referral Source admission list    Reason for Consult discharge planning    Preferred Language English       Contact Information    Permission Granted to Share Info With                    Functional Status       Row Name 08/28/23 1720       Functional Status    Usual Activity Tolerance good    Current Activity Tolerance moderate       Functional Status, IADL    Medications independent    Meal Preparation independent    Housekeeping independent    Laundry independent    Shopping independent                   Psychosocial    No documentation.                  Abuse/Neglect    No documentation.                  Legal    No documentation.                  Substance Abuse    No documentation.                  Patient Forms    No documentation.                     Jenna Barber RN

## 2023-08-28 NOTE — PLAN OF CARE
Goal Outcome Evaluation:  Plan of Care Reviewed With: patient        Progress: no change  Outcome Evaluation: Outcome Evaluation: VSS on 2L NC while sleeping. PRNs given for pain. Pt requested to see someone about BP meds not being restarted at home and the BPs being to high. Paged DONNA Medellin on call. APRN said she did not have time to come up tonight. BPs within normal limits. Pt requested BPs taken q2h. Will pass along to dayshift about questions for BP meds from pt. Incision sites CDI. CORNELIA in place. No other concerns at this time. Will continue with the plan of care.

## 2023-08-28 NOTE — PROGRESS NOTES
"General Surgery Post op Follow Up Note    Subjective:   Feeling a bit better today.    /84   Pulse 68   Temp 98.1 °F (36.7 °C) (Oral)   Resp 18   Ht 177.8 cm (70\")   Wt 113 kg (249 lb 1.9 oz)   SpO2 92%   BMI 35.74 kg/m²     General Appearance: Minimal distress  Abdomen: incision is clean and dry x3, CORNELIA drain benign    CBC  Results from last 7 days   Lab Units 08/28/23  0356   WBC 10*3/mm3 13.18*   HEMOGLOBIN g/dL 13.4   HEMATOCRIT % 41.3   PLATELETS 10*3/mm3 277       CMP/BMP  Results from last 7 days   Lab Units 08/28/23  0356   SODIUM mmol/L 137   POTASSIUM mmol/L 4.1   CHLORIDE mmol/L 103   CO2 mmol/L 24.0   BUN mg/dL 14   CREATININE mg/dL 1.13   CALCIUM mg/dL 8.3*   BILIRUBIN mg/dL 0.6   ALK PHOS U/L 76   ALT (SGPT) U/L 44*   AST (SGOT) U/L 42*   GLUCOSE mg/dL 144*         ASSESSMENT/PLAN:    Postop day 1 laparoscopic cholecystectomy    Advance to a low-fat diet.  May resume his heparin drip, no bolus.  We will continue IV antibiotics for now.  Mobilize.    Lai Cuadra MD  8/28/2023  09:40 EDT   "

## 2023-08-28 NOTE — PROGRESS NOTES
[facility] Medicine Services  PROGRESS NOTE    Patient Name: Juan Suero  : 1960  MRN: [id number]    Date of Admission: 2023  Primary Care Physician: Provider, No Known    Subjective   Subjective     CC:  Gallstones, a/c cholecystitis, PE    HPI:  Ambulating in hallway initially. Pain controlled. No dyspnea. No n/v/d    ROS:  No f/c  No dysuria    Objective   Objective     Vital Signs:   Temp:  [97.8 °F (36.6 °C)-98.7 °F (37.1 °C)] 97.8 °F (36.6 °C)  Heart Rate:  [56-94] 92  Resp:  [16-18] 18  BP: (135-163)/(80-96) 160/86  Flow (L/min):  [2] 2     Physical Exam:  Constitutional:Alert, oriented x 3, nontoxic appearing.   Psych:Normal/appropriate affect  HEENT:NCAT, oropharynx clear  Neck: neck supple, full range of motion  Neuro: Face symmetric, speech clear, equal , moves all extremities  Cardiac: RRR; No pretibial pitting edema  Resp: CTAB, normal effort  GI: abd soft, ruq tenderness, no rebound  Skin: No extremity rash  Musculoskeletal/extremities: no cyanosis of extremities; no significant ankle edema      Results Reviewed:  LAB RESULTS:      Lab 23  0950 23  0356 23  0510 23  2112 23  1038   WBC  --  13.18* 6.55  --  7.64   HEMOGLOBIN  --  13.4 13.6  --  14.6   HEMATOCRIT  --  41.3 41.9  --  44.8   PLATELETS  --  277 240  --  294   NEUTROS ABS  --   --  4.53  --  6.10   IMMATURE GRANS (ABS)  --   --  0.02  --  0.03   LYMPHS ABS  --   --  1.16  --  0.75   MONOS ABS  --   --  0.71  --  0.68   EOS ABS  --   --  0.10  --  0.05   MCV  --  89.0 88.0  --  88.4   LACTATE  --   --   --   --  1.3   PROTIME 15.1*  --   --   --   --    APTT 32.8*  --   --   --   --    HEPARIN ANTI-XA 0.10*  --   --  0.47  --          Lab 23  0356 23  0510 23  1038   SODIUM 137 143 142   POTASSIUM 4.1 4.6 4.3   CHLORIDE 103 110* 109*   CO2 24.0 25.0 23.0   ANION GAP 10.0 8.0 10.0   BUN 14 14 17   CREATININE 1.13 1.22 1.28*   EGFR 72.1 65.8 62.1    GLUCOSE 144* 91 112*   CALCIUM 8.3* 8.5* 8.7   MAGNESIUM 2.0 2.1  --          Lab 08/28/23  0356 08/27/23  0510 08/26/23  1038   TOTAL PROTEIN 6.2 5.8* 6.7   ALBUMIN 3.8 3.7 4.0   GLOBULIN 2.4 2.1 2.7   ALT (SGPT) 44* 20 22   AST (SGOT) 42* 16 18   BILIRUBIN 0.6 0.3 0.4   ALK PHOS 76 80 86   LIPASE  --   --  51         Lab 08/28/23  0950 08/26/23  1239 08/26/23  1038   PROBNP  --   --  97.5   HSTROP T  --  10 10   PROTIME 15.1*  --   --    INR 1.18*  --   --                  Brief Urine Lab Results  (Last result in the past 365 days)        Color   Clarity   Blood   Leuk Est   Nitrite   Protein   CREAT   Urine HCG        08/26/23 1043 Yellow   Clear   Negative   Negative   Negative   Negative                   Microbiology Results Abnormal       None            Adult Transthoracic Echo Complete W/ Cont if Necessary Per Protocol    Result Date: 8/27/2023    Left ventricular ejection fraction appears to be 56 - 60%.   The left ventricular cavity is dilated.   The right ventricular cavity is mildly dilated.   The left atrial cavity is mildly dilated.   The right atrial cavity is mildly  dilated.   Estimated right ventricular systolic pressure from tricuspid regurgitation is normal (<35 mmHg).     Duplex Venous Lower Extremity - Bilateral CAR    Result Date: 8/27/2023    Acute left lower extremity deep vein thrombosis noted in the posterial tibial.      Results for orders placed during the hospital encounter of 08/26/23    Adult Transthoracic Echo Complete W/ Cont if Necessary Per Protocol    Interpretation Summary    Left ventricular ejection fraction appears to be 56 - 60%.    The left ventricular cavity is dilated.    The right ventricular cavity is mildly dilated.    The left atrial cavity is mildly dilated.    The right atrial cavity is mildly  dilated.    Estimated right ventricular systolic pressure from tricuspid regurgitation is normal (<35 mmHg).      Current medications:  Scheduled Meds:amLODIPine, 5 mg,  Oral, Daily  losartan, 100 mg, Oral, Q24H  pantoprazole, 40 mg, Oral, Daily  piperacillin-tazobactam, 3.375 g, Intravenous, Q8H      Continuous Infusions:heparin, 13 Units/kg/hr, Last Rate: 13 Units/kg/hr (08/28/23 1054)  Pharmacy to Dose Heparin,   sodium chloride, 9 mL/hr, Last Rate: 9 mL/hr (08/27/23 1020)      PRN Meds:.  hydrALAZINE    HYDROcodone-acetaminophen    ibuprofen    Magnesium Standard Dose Replacement - Follow Nurse / BPA Driven Protocol    Morphine    ondansetron    Pharmacy to Dose Heparin    Sodium Chloride (PF)    sodium chloride    Assessment & Plan   Assessment & Plan     Active Hospital Problems    Diagnosis  POA    **Pulmonary embolism [I26.99]  Yes    Gallstones [K80.20]  Yes    Essential hypertension [I10]  Yes    Obesity (BMI 30-39.9) [E66.9]  Unknown    Hyperlipidemia [E78.5]  Yes      Resolved Hospital Problems   No resolved problems to display.        Brief Hospital Course to date:  Juan Suero is a 63 y.o. male w/ hx htn, hl who presented with 3 day sof ruq pain, somewhat worsened after eating and sometimes w/ pleurisy. Ct angio chest revealed TARIK and RLL segmental PE's (no strain). Incidentally noted gallstones. Lipase, lft's normal. Initiated on heparin drip. Dr. Cuadra of gen surgery consulted. Echo was normal. Venous duplex showed LLE calf dvt. Heparin drip was held, went for lap ccy on 8/27/23 showing acute on chronic cholecystitis.      Bilateral, segmental Pulmonary emboli (without heart strain, without hypoxia)  LLE DVT (posterior tibial vein)  -likely due to immobility due to frequent traveling via planes and car to [place]  -CT angio chest: several pulmonary emboli within left upper and right lower segmental branches (lungs clear; incidental gallstones)  -echo normal  -BLE venous duplex preliminary: LLE posterior tibial v. dvt  -initiated on heparin drip evening 8/26/23; heparin drip stopped for ccy 8/27/23, then placed on prophylactic dose sq heparin immediately post-op per  surgery recs  -8/28/23: ok'd to restart heparin drip by surgery. Ultimately plan to change to eliquis/noac at discharge    Acute on chronic cholecystitis (s/p lap ccy this admission)  -s/p lap ccy 8/27/23; discussed w/ Dr. Cuadra post-operatively, keeping off of heparin drip tonight but placing on dvt prophylaxis dose heparin. Patient may require GI consultation tomorrow, he will assess in the morning  -Dr. Cuadra following: zosyn (duration of abx TBD) per surgery, advancing diet, ok'd to restart heparin drip (no bolus)    HTN  Bradycardia  -holding atenolol  -continue amlodipine    CKD (baseline cr ~1.3)  -creatinine stable    4 am labs: cbc, cmp, mag    Expected Discharge Location and Transportation: home  Expected Discharge 1-2 days when cleared by surgery post-operatively and tolerating anticoagulation      DVT prophylaxis:  Medical DVT prophylaxis orders are present.     AM-PAC 6 Clicks Score (PT): 24 (08/28/23 0816)    CODE STATUS:   There are no questions and answers to display.       Kenny Kolb MD  08/28/23

## 2023-08-28 NOTE — PLAN OF CARE
Goal Outcome Evaluation:  Plan of Care Reviewed With: patient        Progress: improving  Outcome Evaluation: VSS. RA to 2L NC. Alert and oriented x4. NSR on the monitor. Pt complaints of pain. PRN's given for pain control. No complaints of nausea. Lap sites clean and dry with no drainage. RLQ CORNELIA drain in place with bloody/serous output. Pt ambulating and using ICS with encouragement. Daughter at bedside. Pt request to cycle BP q 2 hours. Pt resting comfortably. No further complaints at this time. Heparin gtt infusing at 13 units/kg/hr. IV antibiotics infused.

## 2023-08-29 LAB
ALBUMIN SERPL-MCNC: 3.6 G/DL (ref 3.5–5.2)
ALBUMIN/GLOB SERPL: 1.2 G/DL
ALP SERPL-CCNC: 72 U/L (ref 39–117)
ALT SERPL W P-5'-P-CCNC: 48 U/L (ref 1–41)
ANION GAP SERPL CALCULATED.3IONS-SCNC: 10 MMOL/L (ref 5–15)
AST SERPL-CCNC: 38 U/L (ref 1–40)
BASOPHILS # BLD AUTO: 0.03 10*3/MM3 (ref 0–0.2)
BASOPHILS NFR BLD AUTO: 0.2 % (ref 0–1.5)
BILIRUB SERPL-MCNC: 0.6 MG/DL (ref 0–1.2)
BUN SERPL-MCNC: 16 MG/DL (ref 8–23)
BUN/CREAT SERPL: 12.1 (ref 7–25)
CALCIUM SPEC-SCNC: 8.5 MG/DL (ref 8.6–10.5)
CHLORIDE SERPL-SCNC: 105 MMOL/L (ref 98–107)
CO2 SERPL-SCNC: 26 MMOL/L (ref 22–29)
CREAT SERPL-MCNC: 1.32 MG/DL (ref 0.76–1.27)
CYTO UR: NORMAL
DEPRECATED RDW RBC AUTO: 42.9 FL (ref 37–54)
EGFRCR SERPLBLD CKD-EPI 2021: 59.9 ML/MIN/1.73
EOSINOPHIL # BLD AUTO: 0.06 10*3/MM3 (ref 0–0.4)
EOSINOPHIL NFR BLD AUTO: 0.5 % (ref 0.3–6.2)
ERYTHROCYTE [DISTWIDTH] IN BLOOD BY AUTOMATED COUNT: 13.2 % (ref 12.3–15.4)
GLOBULIN UR ELPH-MCNC: 2.9 GM/DL
GLUCOSE SERPL-MCNC: 133 MG/DL (ref 65–99)
HCT VFR BLD AUTO: 40.9 % (ref 37.5–51)
HGB BLD-MCNC: 13.2 G/DL (ref 13–17.7)
IMM GRANULOCYTES # BLD AUTO: 0.05 10*3/MM3 (ref 0–0.05)
IMM GRANULOCYTES NFR BLD AUTO: 0.4 % (ref 0–0.5)
LAB AP CASE REPORT: NORMAL
LAB AP CLINICAL INFORMATION: NORMAL
LYMPHOCYTES # BLD AUTO: 0.81 10*3/MM3 (ref 0.7–3.1)
LYMPHOCYTES NFR BLD AUTO: 6.7 % (ref 19.6–45.3)
MAGNESIUM SERPL-MCNC: 2.1 MG/DL (ref 1.6–2.4)
MCH RBC QN AUTO: 28.8 PG (ref 26.6–33)
MCHC RBC AUTO-ENTMCNC: 32.3 G/DL (ref 31.5–35.7)
MCV RBC AUTO: 89.1 FL (ref 79–97)
MONOCYTES # BLD AUTO: 1.19 10*3/MM3 (ref 0.1–0.9)
MONOCYTES NFR BLD AUTO: 9.9 % (ref 5–12)
NEUTROPHILS NFR BLD AUTO: 82.3 % (ref 42.7–76)
NEUTROPHILS NFR BLD AUTO: 9.87 10*3/MM3 (ref 1.7–7)
NRBC BLD AUTO-RTO: 0 /100 WBC (ref 0–0.2)
PATH REPORT.FINAL DX SPEC: NORMAL
PATH REPORT.GROSS SPEC: NORMAL
PLATELET # BLD AUTO: 242 10*3/MM3 (ref 140–450)
PMV BLD AUTO: 9.7 FL (ref 6–12)
POTASSIUM SERPL-SCNC: 3.9 MMOL/L (ref 3.5–5.2)
PROT SERPL-MCNC: 6.5 G/DL (ref 6–8.5)
RBC # BLD AUTO: 4.59 10*6/MM3 (ref 4.14–5.8)
SODIUM SERPL-SCNC: 141 MMOL/L (ref 136–145)
UFH PPP CHRO-ACNC: 0.2 IU/ML (ref 0.3–0.7)
UFH PPP CHRO-ACNC: 0.47 IU/ML (ref 0.3–0.7)
UFH PPP CHRO-ACNC: 0.6 IU/ML (ref 0.3–0.7)
UFH PPP CHRO-ACNC: 0.68 IU/ML (ref 0.3–0.7)
WBC NRBC COR # BLD: 12.01 10*3/MM3 (ref 3.4–10.8)

## 2023-08-29 PROCEDURE — 83735 ASSAY OF MAGNESIUM: CPT | Performed by: INTERNAL MEDICINE

## 2023-08-29 PROCEDURE — 80053 COMPREHEN METABOLIC PANEL: CPT | Performed by: INTERNAL MEDICINE

## 2023-08-29 PROCEDURE — 85520 HEPARIN ASSAY: CPT

## 2023-08-29 PROCEDURE — 25010000002 PIPERACILLIN SOD-TAZOBACTAM PER 1 G: Performed by: SURGERY

## 2023-08-29 PROCEDURE — 85025 COMPLETE CBC W/AUTO DIFF WBC: CPT | Performed by: SURGERY

## 2023-08-29 PROCEDURE — 25010000002 HEPARIN (PORCINE) 25000-0.45 UT/250ML-% SOLUTION

## 2023-08-29 RX ORDER — BISACODYL 5 MG/1
5 TABLET, DELAYED RELEASE ORAL DAILY PRN
Status: DISCONTINUED | OUTPATIENT
Start: 2023-08-29 | End: 2023-08-30 | Stop reason: HOSPADM

## 2023-08-29 RX ORDER — POLYETHYLENE GLYCOL 3350 17 G/17G
17 POWDER, FOR SOLUTION ORAL DAILY PRN
Status: DISCONTINUED | OUTPATIENT
Start: 2023-08-29 | End: 2023-08-30 | Stop reason: HOSPADM

## 2023-08-29 RX ORDER — BISACODYL 10 MG
10 SUPPOSITORY, RECTAL RECTAL DAILY PRN
Status: DISCONTINUED | OUTPATIENT
Start: 2023-08-29 | End: 2023-08-30 | Stop reason: HOSPADM

## 2023-08-29 RX ORDER — AMOXICILLIN 250 MG
2 CAPSULE ORAL 2 TIMES DAILY
Status: DISCONTINUED | OUTPATIENT
Start: 2023-08-29 | End: 2023-08-30 | Stop reason: HOSPADM

## 2023-08-29 RX ORDER — ACETAMINOPHEN 325 MG/1
650 TABLET ORAL EVERY 6 HOURS PRN
Status: DISCONTINUED | OUTPATIENT
Start: 2023-08-29 | End: 2023-08-30 | Stop reason: HOSPADM

## 2023-08-29 RX ADMIN — LOSARTAN POTASSIUM 100 MG: 50 TABLET, FILM COATED ORAL at 08:11

## 2023-08-29 RX ADMIN — PIPERACILLIN SODIUM AND TAZOBACTAM SODIUM 3.38 G: 3; .375 INJECTION, SOLUTION INTRAVENOUS at 02:02

## 2023-08-29 RX ADMIN — PIPERACILLIN SODIUM AND TAZOBACTAM SODIUM 3.38 G: 3; .375 INJECTION, SOLUTION INTRAVENOUS at 11:34

## 2023-08-29 RX ADMIN — HYDROCODONE BITARTRATE AND ACETAMINOPHEN 1 TABLET: 5; 325 TABLET ORAL at 02:16

## 2023-08-29 RX ADMIN — SENNOSIDES AND DOCUSATE SODIUM 2 TABLET: 8.6; 5 TABLET ORAL at 09:34

## 2023-08-29 RX ADMIN — HEPARIN SODIUM 13.5 UNITS/KG/HR: 10000 INJECTION, SOLUTION INTRAVENOUS at 04:25

## 2023-08-29 RX ADMIN — POLYETHYLENE GLYCOL 3350 17 G: 17 POWDER, FOR SOLUTION ORAL at 15:33

## 2023-08-29 RX ADMIN — ACETAMINOPHEN 650 MG: 325 TABLET ORAL at 13:18

## 2023-08-29 RX ADMIN — ACETAMINOPHEN 650 MG: 325 TABLET ORAL at 20:42

## 2023-08-29 RX ADMIN — HEPARIN SODIUM 12.5 UNITS/KG/HR: 10000 INJECTION, SOLUTION INTRAVENOUS at 21:51

## 2023-08-29 RX ADMIN — PANTOPRAZOLE SODIUM 40 MG: 40 TABLET, DELAYED RELEASE ORAL at 08:11

## 2023-08-29 RX ADMIN — SENNOSIDES AND DOCUSATE SODIUM 2 TABLET: 8.6; 5 TABLET ORAL at 20:42

## 2023-08-29 RX ADMIN — AMLODIPINE BESYLATE 5 MG: 5 TABLET ORAL at 08:11

## 2023-08-29 NOTE — PROGRESS NOTES
[facility] Medicine Services  PROGRESS NOTE    Patient Name: Juan Suero  : 1960  MRN: [id number]    Date of Admission: 2023  Primary Care Physician: Provider, No Known    Subjective   Subjective     CC:  Gallstones, a/c cholecystitis, PE    HPI:  Ambulating in hallway. No dyspnea. No n/v/d    ROS:  No f/c  No dysuria    Objective   Objective     Vital Signs:   Temp:  [97.8 °F (36.6 °C)-99.2 °F (37.3 °C)] 98.3 °F (36.8 °C)  Heart Rate:  [] 120  Resp:  [18] 18  BP: (148-166)/(81-97) 156/95  Flow (L/min):  [2] 2     Physical Exam:  Constitutional:Alert, oriented x 3, nontoxic appearing.   Psych:Normal/appropriate affect  HEENT:NCAT, oropharynx clear  Neck: neck supple, full range of motion  Neuro: Face symmetric, speech clear, equal , moves all extremities  Cardiac: rrr; No pretibial pitting edema  Resp: ctab, normal effort  GI: abd soft, ruq tenderness,appropriate post-op tenderness  Skin: No extremity rash  Musculoskeletal/extremities: no cyanosis of extremities; no significant ankle edema      Results Reviewed:  LAB RESULTS:      Lab 23  1430 23  0754 23  0213 23  1806 23  0950 23  0356 23  0510 23  2112 23  1038   WBC  --   --  12.01*  --   --  13.18* 6.55  --  7.64   HEMOGLOBIN  --   --  13.2  --   --  13.4 13.6  --  14.6   HEMATOCRIT  --   --  40.9  --   --  41.3 41.9  --  44.8   PLATELETS  --   --  242  --   --  277 240  --  294   NEUTROS ABS  --   --  9.87*  --   --   --  4.53  --  6.10   IMMATURE GRANS (ABS)  --   --  0.05  --   --   --  0.02  --  0.03   LYMPHS ABS  --   --  0.81  --   --   --  1.16  --  0.75   MONOS ABS  --   --  1.19*  --   --   --  0.71  --  0.68   EOS ABS  --   --  0.06  --   --   --  0.10  --  0.05   MCV  --   --  89.1  --   --  89.0 88.0  --  88.4   LACTATE  --   --   --   --   --   --   --   --  1.3   PROTIME  --   --   --   --  15.1*  --   --   --   --    APTT  --   --   --   --   32.8*  --   --   --   --    HEPARIN ANTI-XA 0.47 0.68 0.60 0.27* 0.10*  --   --    < >  --     < > = values in this interval not displayed.         Lab 08/29/23  0213 08/28/23  0356 08/27/23  0510 08/26/23  1038   SODIUM 141 137 143 142   POTASSIUM 3.9 4.1 4.6 4.3   CHLORIDE 105 103 110* 109*   CO2 26.0 24.0 25.0 23.0   ANION GAP 10.0 10.0 8.0 10.0   BUN 16 14 14 17   CREATININE 1.32* 1.13 1.22 1.28*   EGFR 59.9* 72.1 65.8 62.1   GLUCOSE 133* 144* 91 112*   CALCIUM 8.5* 8.3* 8.5* 8.7   MAGNESIUM 2.1 2.0 2.1  --          Lab 08/29/23  0213 08/28/23  0356 08/27/23  0510 08/26/23  1038   TOTAL PROTEIN 6.5 6.2 5.8* 6.7   ALBUMIN 3.6 3.8 3.7 4.0   GLOBULIN 2.9 2.4 2.1 2.7   ALT (SGPT) 48* 44* 20 22   AST (SGOT) 38 42* 16 18   BILIRUBIN 0.6 0.6 0.3 0.4   ALK PHOS 72 76 80 86   LIPASE  --   --   --  51         Lab 08/28/23  0950 08/26/23  1239 08/26/23  1038   PROBNP  --   --  97.5   HSTROP T  --  10 10   PROTIME 15.1*  --   --    INR 1.18*  --   --                  Brief Urine Lab Results  (Last result in the past 365 days)        Color   Clarity   Blood   Leuk Est   Nitrite   Protein   CREAT   Urine HCG        08/26/23 1043 Yellow   Clear   Negative   Negative   Negative   Negative                   Microbiology Results Abnormal       Procedure Component Value - Date/Time    MRSA Screen, PCR (Inpatient) - Swab, Nares [[id number]]  (Normal) Collected: 08/28/23 1815    Lab Status: Final result Specimen: Swab from Nares Updated: 08/28/23 2047     MRSA PCR Negative    Narrative:      The negative predictive value of this diagnostic test is high and should only be used to consider de-escalating anti-MRSA therapy. A positive result may indicate colonization with MRSA and must be correlated clinically.  MRSA Negative            No radiology results from the last 24 hrs    Results for orders placed during the hospital encounter of 08/26/23    Adult Transthoracic Echo Complete W/ Cont if Necessary Per Protocol    Interpretation  Summary    Left ventricular ejection fraction appears to be 56 - 60%.    The left ventricular cavity is dilated.    The right ventricular cavity is mildly dilated.    The left atrial cavity is mildly dilated.    The right atrial cavity is mildly  dilated.    Estimated right ventricular systolic pressure from tricuspid regurgitation is normal (<35 mmHg).      Current medications:  Scheduled Meds:amLODIPine, 5 mg, Oral, Daily  losartan, 100 mg, Oral, Q24H  pantoprazole, 40 mg, Oral, Daily  senna-docusate sodium, 2 tablet, Oral, BID      Continuous Infusions:heparin, 12.5 Units/kg/hr, Last Rate: 12.5 Units/kg/hr (08/29/23 0934)  Pharmacy to Dose Heparin,   sodium chloride, 9 mL/hr, Last Rate: 9 mL/hr (08/27/23 1020)      PRN Meds:.  acetaminophen    senna-docusate sodium **AND** polyethylene glycol **AND** bisacodyl **AND** bisacodyl    hydrALAZINE    HYDROcodone-acetaminophen    Magnesium Standard Dose Replacement - Follow Nurse / BPA Driven Protocol    Morphine    ondansetron    Pharmacy to Dose Heparin    Sodium Chloride (PF)    sodium chloride    Assessment & Plan   Assessment & Plan     Active Hospital Problems    Diagnosis  POA    **Pulmonary embolism [I26.99]  Yes    Gallstones [K80.20]  Yes    Essential hypertension [I10]  Yes    Obesity (BMI 30-39.9) [E66.9]  Unknown    Hyperlipidemia [E78.5]  Yes      Resolved Hospital Problems   No resolved problems to display.        Brief Hospital Course to date:  Juan Suero is a 63 y.o. male w/ hx htn, hl who presented with 3 day sof ruq pain, somewhat worsened after eating and sometimes w/ pleurisy. Ct angio chest revealed TARIK and RLL segmental PE's (no strain). Incidentally noted gallstones. Lipase, lft's normal. Initiated on heparin drip. Dr. Cuadra of gen surgery consulted. Echo was normal. Venous duplex showed LLE calf dvt. Heparin drip was held, went for lap ccy on 8/27/23 showing acute on chronic cholecystitis.      Bilateral, segmental Pulmonary emboli (without  heart strain, without hypoxia)  LLE DVT (posterior tibial vein)  -likely due to immobility due to frequent traveling via planes and car to [place]  -CT angio chest: several pulmonary emboli within left upper and right lower segmental branches (lungs clear; incidental gallstones)  -echo normal  -BLE venous duplex: LLE posterior tibial vein DVT  -initiated on heparin drip evening 8/26/23; heparin drip stopped for ccy 8/27/23, then placed on prophylactic dose sq heparin immediately post-op per surgery recs  -ok'd for heparin drip on 8/28 after lap ccy  -8/29/23: continue heparin drip for now, plan to change to eliquis/NOAC tomorrow prior to discharge once cleared for discharge by Dr. Cuadra      Acute on chronic cholecystitis (s/p lap ccy this admission)  -s/p lap ccy 8/27/23; discussed w/ Dr. Cuadra post-operatively, keeping off of heparin drip tonight but placing on dvt prophylaxis dose heparin. Patient may require GI consultation tomorrow, he will assess in the morning  -Dr. Cuadra following: stopped zosyn 8/29; advancing diet; danielle drain being monitored (currently no bilous output). From surgical standpoint if tolerating diet, if no bile in danielle drain tomorrow then he will d/c danielle drain and would be cleared for discharge.     HTN  Bradycardia  -holding atenolol  -continue amlodipine and losartan    CKD (baseline cr ~1.3)  -creatinine stable    Am labs: cbc,bmp    Expected Discharge Location and Transportation: home  Expected Discharge 8/30/23 depending on clinical course      DVT prophylaxis:  Medical DVT prophylaxis orders are present.     AM-PAC 6 Clicks Score (PT): 24 (08/29/23 8790)    CODE STATUS:   There are no questions and answers to display.       Kenny Kolb MD  08/29/23

## 2023-08-29 NOTE — PROGRESS NOTES
HEPARIN INFUSION  Juan Suero is a  63 y.o. male receiving heparin infusion.     Therapy for (VTE/Cardiac):   VTE  Patient Weight: 110 Kg  Initial Bolus (Y/N):   No  Any Bolus (Y/N):   No        Signs or Symptoms of Bleeding: None    VTE (PE/DVT)   Initial Bolus: 80 units/kg (Max 10,000 units)  Initial rate: 18 units/kg/hr (Max 1,500 units/hr)    Anti Xa Rebolus Infusion Hold time Change infusion Dose (Units/kg/hr) Next Anti Xa Level Due   < 0.11 50 Units/kg  (4000 Units Max) None Increase by  4 Units/kg/hr 6 hours   0.11 - 0.19 25 Units/kg  (2000 Units Max) None Increase by  3 Units/kg/hr 6 hours   0.2 - 0.29 0 None Increase by  2 Units/kg/hr 6 hours   0.3 - 0.7 0 None No Change 6 hours (after 2 consecutive levels in range check qAM)   0.71 - 0.8 0 None Decrease by  1 Units/kg/hr 6 hours   0.81 - 0.9 0 None Decrease by  2 Units/kg/hr 6 hours   0.91 - 1 0 60 Minutes Decrease by  3 Units/kg/hr 6 hours   >1 0 Hold  After Anti Xa less than 0.7 decrease previous rate by  4 Units/kg/hr  Every 2 hours until Anti Xa is less than 0.7 then when infusion restarts in 6 hours     Results from last 7 days   Lab Units 08/29/23  0213 08/28/23  0950 08/28/23  0356 08/27/23  0510   INR   --  1.18*  --   --    HEMOGLOBIN g/dL 13.2  --  13.4 13.6   HEMATOCRIT % 40.9  --  41.3 41.9   PLATELETS 10*3/mm3 242  --  277 240          Date   Time   Anti-Xa Current Rate (Unit/kg/hr) Bolus   (Units) Rate Change   (Unit/kg/hr) New Rate (Unit/kg/hr) Next   Anti-Xa Comments  Pump Check Daily   8/28 1000 - 0 -- +13 13 1700 Discussed with RN    8/28 1806 0.27 13 -- + 0.5 13.5 0200 Spoke with RN   8/29 0213 0.6 13.5 -- -- 13.5 0800 Discussed w/ nurse   8/29  0754 0.68 13.5 -- -1 12.5 1500 Pump reviewed, dw/ RN                                                                                                                                                                                                 Guillermo Cassidy, PharmD  8/29/2023  08:26 EDT

## 2023-08-29 NOTE — CASE MANAGEMENT/SOCIAL WORK
Continued Stay Note  [facility]     Patient Name: Juan Suero  MRN: [id number]  Today's Date: 8/29/2023    Admit Date: 8/26/2023    Plan: CM update   Discharge Plan       Row Name 08/29/23 1517       Plan    Plan CM update    Plan Comments Patient's CORNELIA drain remains in place at this time as well as his heparin drip. Goal remains to return home upon discharge - CM will continue to follow- elida [phone]    Final Discharge Disposition Code 01 - home or self-care                   Discharge Codes    No documentation.                 Expected Discharge Date and Time       Expected Discharge Date Expected Discharge Time    Aug 30, 2023               Elida Barber RN

## 2023-08-29 NOTE — PROGRESS NOTES
"General Surgery Post op Follow Up Note    Subjective:   Tolerating p.o. with flatus.     /85   Pulse 66   Temp 98.3 °F (36.8 °C) (Oral)   Resp 18   Ht 177.8 cm (70\")   Wt 110 kg (243 lb)   SpO2 94%   BMI 34.87 kg/m²     General Appearance: Mild distress  Abdomen: incision is clean and dry, x3.  CORNELIA benign.    CBC  Results from last 7 days   Lab Units 08/29/23  0213   WBC 10*3/mm3 12.01*   HEMOGLOBIN g/dL 13.2   HEMATOCRIT % 40.9   PLATELETS 10*3/mm3 242       CMP/BMP  Results from last 7 days   Lab Units 08/29/23  0213   SODIUM mmol/L 141   POTASSIUM mmol/L 3.9   CHLORIDE mmol/L 105   CO2 mmol/L 26.0   BUN mg/dL 16   CREATININE mg/dL 1.32*   CALCIUM mg/dL 8.5*   BILIRUBIN mg/dL 0.6   ALK PHOS U/L 72   ALT (SGPT) U/L 48*   AST (SGOT) U/L 38   GLUCOSE mg/dL 133*         ASSESSMENT/PLAN:    Status post laparoscopic cholecystectomy    If there is no bile in the drain tomorrow we can go ahead and remove the CORNELIA drain.  Regular diet as tolerated.  Increase in creatinine.  Will stop Zosyn, NSAIDs stopped also.    From a surgical standpoint if he is tolerating a regular diet there is no bile in the drain tomorrow I will go ahead and remove the CORNELIA drain.  He would be free to go at that point.    Lai Cuadra MD  8/29/2023  13:02 EDT   "

## 2023-08-29 NOTE — PLAN OF CARE
Goal Outcome Evaluation:  Plan of Care Reviewed With: patient        Progress: improving  Outcome Evaluation: VSS. RA. C/O mod to severe pain. PRN tylenol administered per pt request. Stool softner and prn miralax administered. Passing gas. No BM yet. Ambulating in velazco. Daughter at bedside. ABX infused. 30ml out in CORNELIA serosang.

## 2023-08-29 NOTE — PLAN OF CARE
Goal Outcome Evaluation:  Plan of Care Reviewed With: patient        Progress: improving  Outcome Evaluation: VSS on 2L NC. NSR. A&O x4. PRNs given for pain. CORNELIA to bulb suction with minimal output. Heparin gtt infusing at 13.5 units/kg/hr. Pt resting comfortably. Will continue plan of care.

## 2023-08-29 NOTE — PAYOR COMM NOTE
"Utilization Review phone: [phone]  Fax: [phone]  Ref # [id number]   Juan Cordon (63 y.o. Male)       Date of Birth   1960    Social Security Number       Address   [address] [place] [id number]    Home Phone       MRN   [id number]       Druze   None    Marital Status                               Admission Date   23    Admission Type   Emergency    Admitting Provider   Kenny Kolb MD    Attending Provider   Kenny Kolb MD    Department, Room/Bed   [facility] 2F, S207/1       Discharge Date       Discharge Disposition       Discharge Destination                                 Attending Provider: Kenny Kolb MD    Allergies: Sulfa Antibiotics    Isolation: None   Infection: None   Code Status: Not on file    Ht: 177.8 cm (70\")   Wt: 110 kg (243 lb)    Admission Cmt: None   Principal Problem: Pulmonary embolism [I26.99]                   Active Insurance as of 2023       Primary Coverage       Payor Plan Insurance Group Employer/Plan Group    MISC COMMERCIAL MISC COMMERCIAL [id number]       Coverage Address Coverage Phone Number Coverage Fax Number Effective Dates    [address] [id number] [phone]  2019 - None Entered    [facility] [id number]         Subscriber Name Subscriber Birth Date Member ID       JUAN CORDON 1960 [id number]                     Emergency Contacts        (Rel.) Home Phone Work Phone Mobile Phone    Stefani Cordon (Spouse) -- -- [phone]    TAMMY CORDON (Daughter) [phone] -- [phone]                 History & Physical        Aurelia Alonso MD at 23 [facility]              [facility] Medicine Services  HISTORY AND PHYSICAL    Patient Name: Juan Cordon  : 1960  MRN: [id number]  Primary Care Physician: Provider, No Known    Subjective  Subjective     Chief Complaint:ruq abdominal pain    HPI:  Juan Cordon is a 63 y.o. male who  has a past " medical history of Hyperlipidemia and Hypertension. who presented to the ED with 3 days of persistent RUQ abdominal pain worse with a deep breath and initiated after eating several slices of singh. He has been traveling every other week to and from [place] where his wife is being treated at [facility] for breast and metastatic colon cancer. He denies ever having pain like this before but has been unable to sleep for 3 days and had a poor appetite.      Review of Systems   Patient denies weight loss, headaches, changes in vision, fever, chills, sore throat, shortness of breath, cough, nausea or vomiting, diarrhea, change in urine output or habits, joint pain, rash, itching or bleeding.    Otherwise complete ROS is negative except as mentioned in the HPI.    Personal History       PMH: He  has a past medical history of Hyperlipidemia and Hypertension.   PSxH: He  has a past surgical history that includes Hernia repair and Wyckoff tooth extraction.         FH: His family history is reviewed and NOT contributory.   SH: He  reports that he has never smoked. He has never used smokeless tobacco. He reports current alcohol use. Drug use questions deferred to the physician.   Retired  from [employer]  Medications:  amLODIPine, aspirin, atenolol, cholecalciferol, losartan, multivitamin with minerals, naproxen, and pantoprazole    Allergies   Allergen Reactions    Sulfa Antibiotics Hives       Objective  Objective     Vital Signs:   Temp:  [98.1 °F (36.7 °C)] 98.1 °F (36.7 °C)  Heart Rate:  [45-61] 50  Resp:  [18] 18  BP: (148-170)/(84-98) 156/92    Constitutional: Awake, alert, interactive and pleasant, uncomfortable  Eyes: clear sclerae, no conjunctival injection  HENT: NCAT, mucous membranes moist  Neck: no masses or lymphadenopathy, trachea midline  Respiratory: good breath sounds bilaterally, respirations unlabored  Cardiovascular: RRR, no murmurs appreciated, palpable peripheral pulses  Abdomen:  soft, no HSM or masses  palpable, exquisitely tender RUQ with rebound and guarding  Musculoskeletal: No peripheral edema, clubbing or cyanosis  Neurologic: Oriented x 3,                       Strength symmetric in all extremities                     Cranial Nerves grossly intact, speech clear  Skin: No rashes or jaundice  Psychiatric: Appropriate mood, insight      Result Review:  I have personally reviewed the results from the time of this admission   to 8/26/2023 14:41 EDT and agree with these findings:  [x]  Laboratory  [x]  Microbiology  [x]  Radiology  [x]  EKG/Telemetry   []  Cardiology/Vascular   []  Pathology  [x]  Old records  []  Other:  Most notable findings include: normal CBC and CMP, slight bump in creatinine and glucose, normal lactic acid      LAB RESULTS:      Lab 08/26/23  1038   WBC 7.64   HEMOGLOBIN 14.6   HEMATOCRIT 44.8   PLATELETS 294   NEUTROS ABS 6.10   IMMATURE GRANS (ABS) 0.03   LYMPHS ABS 0.75   MONOS ABS 0.68   EOS ABS 0.05   MCV 88.4   LACTATE 1.3         Lab 08/26/23  1038   SODIUM 142   POTASSIUM 4.3   CHLORIDE 109*   CO2 23.0   ANION GAP 10.0   BUN 17   CREATININE 1.28*   EGFR 62.1   GLUCOSE 112*   CALCIUM 8.7         Lab 08/26/23  1038   TOTAL PROTEIN 6.7   ALBUMIN 4.0   GLOBULIN 2.7   ALT (SGPT) 22   AST (SGOT) 18   BILIRUBIN 0.4   ALK PHOS 86   LIPASE 51         Lab 08/26/23  1239 08/26/23  1038   PROBNP  --  97.5   HSTROP T 10 10                 Brief Urine Lab Results  (Last result in the past 365 days)        Color   Clarity   Blood   Leuk Est   Nitrite   Protein   CREAT   Urine HCG        08/26/23 1043 Yellow   Clear   Negative   Negative   Negative   Negative                     CT Abdomen Pelvis With Contrast    Result Date: 8/26/2023  CT ANGIOGRAM CHEST, CT ABDOMEN PELVIS W CONTRAST Date of Exam: 8/26/2023 12:49 PM EDT Indication: PE. HISTORY OF HISTO.. Comparison: None available. Technique: CTA of the chest abdomen and pelvis was performed after the uneventful intravenous administration of 85 mL  Isovue-370. Reconstructed coronal and sagittal images were also obtained. In addition, a 3-D volume rendered image was created for interpretation. Automated exposure control and iterative reconstruction methods were used. Findings: Chest: The central tracheobronchial tree is clear. A couple benign calcified granulomas are noted. There is no focal consolidation. There is no pleural effusion. The heart is enlarged. The great vessels are normal in caliber. There are several pulmonary emboli within left upper and right lower lobe segmental branches. There is no evidence of acute right heart strain. No abnormally enlarged lymph nodes are identified. No aggressive osseous lesions are identified. Abdomen/pelvis: The liver, adrenal glands, left kidney, spleen, and pancreas are unremarkable. There are bulky stones in the gallbladder. There is a small right renal cyst. The stomach appears normal. The small bowel appears normal in caliber and configuration. The colon appears normal. The appendix appears normal. There is no ascites or loculated collection. No abnormally enlarged lymph nodes are identified. The rectum, prostate, and urinary bladder are unremarkable. No aggressive osseous lesions are identified.     Impression: Impression: 1.Several pulmonary emboli within left upper and right lower lobe segmental branches. 2.Lungs are clear. 3.Cardiomegaly. 4.No acute process identified within abdomen/pelvis 5.Cholelithiasis. Results were discussed with DONNA Wiley at time of dictation. Electronically Signed: Jonathon Laws MD  8/26/2023 1:20 PM EDT  Workstation ID: [id number]    CT Angiogram Chest    Result Date: 8/26/2023  CT ANGIOGRAM CHEST, CT ABDOMEN PELVIS W CONTRAST Date of Exam: 8/26/2023 12:49 PM EDT Indication: PE. HISTORY OF HISTO.. Comparison: None available. Technique: CTA of the chest abdomen and pelvis was performed after the uneventful intravenous administration of 85 mL Isovue-370. Reconstructed coronal  and sagittal images were also obtained. In addition, a 3-D volume rendered image was created for interpretation. Automated exposure control and iterative reconstruction methods were used. Findings: Chest: The central tracheobronchial tree is clear. A couple benign calcified granulomas are noted. There is no focal consolidation. There is no pleural effusion. The heart is enlarged. The great vessels are normal in caliber. There are several pulmonary emboli within left upper and right lower lobe segmental branches. There is no evidence of acute right heart strain. No abnormally enlarged lymph nodes are identified. No aggressive osseous lesions are identified. Abdomen/pelvis: The liver, adrenal glands, left kidney, spleen, and pancreas are unremarkable. There are bulky stones in the gallbladder. There is a small right renal cyst. The stomach appears normal. The small bowel appears normal in caliber and configuration. The colon appears normal. The appendix appears normal. There is no ascites or loculated collection. No abnormally enlarged lymph nodes are identified. The rectum, prostate, and urinary bladder are unremarkable. No aggressive osseous lesions are identified.     Impression: Impression: 1.Several pulmonary emboli within left upper and right lower lobe segmental branches. 2.Lungs are clear. 3.Cardiomegaly. 4.No acute process identified within abdomen/pelvis 5.Cholelithiasis. Results were discussed with DONNA Wiley at time of dictation. Electronically Signed: Jonathon Laws MD  8/26/2023 1:20 PM EDT  Workstation ID: [id number]         The patient has started, but not completed, their COVID-19 vaccination series.    Assessment & Plan  Assessment / Plan       Pulmonary embolism    Gallstones    Essential hypertension    Obesity (BMI 30-39.9)    Hyperlipidemia      Assessment & Plan:  64 yo with HO HTN,HLP and obesity who presented to the ED with 3 days of RUQ abdominal pain     Symptomatic gallstones  -will  "ask Gen surg to see, keep NPO  -discussed risks of surgery with known PE but also that the gallstones seem to be his primary complaint.  -cont symptomatic medications  -serial exams  -NPO after midnight    Bilateral PE  -patient started on heparin until seen by surgery  -transition to NOAC once surgery has been ruled out  -check Duplex for more clot burden    HTN with elevated creatinine  -cone beta blocker, CCB hold ARB for now  Creatinine 1.19 a year ago    Morbid obesity, BMI 36    Hyperlipidemia  -cont statin    DVT prophylaxis:  Medical DVT prophylaxis orders are present.    CODE STATUS:FULL CODE     Expected Discharge  Expected Discharge Date: 8/27/2023; Expected Discharge Time:     Electronically signed by Aurelia Alonso MD 08/26/23 14:41 EDT            Electronically signed by Aurelia Alonso MD at 08/26/23 2133          Physician Progress Notes (all)        Lai Cuadra MD at 08/29/23 1301          General Surgery Post op Follow Up Note    Subjective:   Tolerating p.o. with flatus.     /85   Pulse 66   Temp 98.3 °F (36.8 °C) (Oral)   Resp 18   Ht 177.8 cm (70\")   Wt 110 kg (243 lb)   SpO2 94%   BMI 34.87 kg/m²     General Appearance: Mild distress  Abdomen: incision is clean and dry, x3.  CORNELIA benign.    CBC  Results from last 7 days   Lab Units 08/29/23  0213   WBC 10*3/mm3 12.01*   HEMOGLOBIN g/dL 13.2   HEMATOCRIT % 40.9   PLATELETS 10*3/mm3 242       CMP/BMP  Results from last 7 days   Lab Units 08/29/23  0213   SODIUM mmol/L 141   POTASSIUM mmol/L 3.9   CHLORIDE mmol/L 105   CO2 mmol/L 26.0   BUN mg/dL 16   CREATININE mg/dL 1.32*   CALCIUM mg/dL 8.5*   BILIRUBIN mg/dL 0.6   ALK PHOS U/L 72   ALT (SGPT) U/L 48*   AST (SGOT) U/L 38   GLUCOSE mg/dL 133*         ASSESSMENT/PLAN:    Status post laparoscopic cholecystectomy    If there is no bile in the drain tomorrow we can go ahead and remove the CORNELIA drain.  Regular diet as tolerated.  Increase in creatinine.  Will stop Zosyn, " NSAIDs stopped also.    From a surgical standpoint if he is tolerating a regular diet there is no bile in the drain tomorrow I will go ahead and remove the CORNELIA drain.  He would be free to go at that point.    Lai Cuadra MD  2023  13:02 EDT     Electronically signed by Lai Cuadra MD at 23 1305       [place]enny MD at 23 1603              [facility] Medicine Services  PROGRESS NOTE    Patient Name: Juan Suero  : 1960  MRN: [id number]    Date of Admission: 2023  Primary Care Physician: Provider, No Known    Subjective   Subjective     CC:  Gallstones, a/c cholecystitis, PE    HPI:  Ambulating in hallway initially. Pain controlled. No dyspnea. No n/v/d    ROS:  No f/c  No dysuria    Objective   Objective     Vital Signs:   Temp:  [97.8 °F (36.6 °C)-98.7 °F (37.1 °C)] 97.8 °F (36.6 °C)  Heart Rate:  [56-94] 92  Resp:  [16-18] 18  BP: (135-163)/(80-96) 160/86  Flow (L/min):  [2] 2     Physical Exam:  Constitutional:Alert, oriented x 3, nontoxic appearing.   Psych:Normal/appropriate affect  HEENT:NCAT, oropharynx clear  Neck: neck supple, full range of motion  Neuro: Face symmetric, speech clear, equal , moves all extremities  Cardiac: RRR; No pretibial pitting edema  Resp: CTAB, normal effort  GI: abd soft, ruq tenderness, no rebound  Skin: No extremity rash  Musculoskeletal/extremities: no cyanosis of extremities; no significant ankle edema      Results Reviewed:  LAB RESULTS:      Lab 23  0950 23  0356 23  0510 23  2112 23  1038   WBC  --  13.18* 6.55  --  7.64   HEMOGLOBIN  --  13.4 13.6  --  14.6   HEMATOCRIT  --  41.3 41.9  --  44.8   PLATELETS  --  277 240  --  294   NEUTROS ABS  --   --  4.53  --  6.10   IMMATURE GRANS (ABS)  --   --  0.02  --  0.03   LYMPHS ABS  --   --  1.16  --  0.75   MONOS ABS  --   --  0.71  --  0.68   EOS ABS  --   --  0.10  --  0.05   MCV  --  89.0 88.0  --  88.4    LACTATE  --   --   --   --  1.3   PROTIME 15.1*  --   --   --   --    APTT 32.8*  --   --   --   --    HEPARIN ANTI-XA 0.10*  --   --  0.47  --          Lab 08/28/23  0356 08/27/23  0510 08/26/23  1038   SODIUM 137 143 142   POTASSIUM 4.1 4.6 4.3   CHLORIDE 103 110* 109*   CO2 24.0 25.0 23.0   ANION GAP 10.0 8.0 10.0   BUN 14 14 17   CREATININE 1.13 1.22 1.28*   EGFR 72.1 65.8 62.1   GLUCOSE 144* 91 112*   CALCIUM 8.3* 8.5* 8.7   MAGNESIUM 2.0 2.1  --          Lab 08/28/23  0356 08/27/23  0510 08/26/23  1038   TOTAL PROTEIN 6.2 5.8* 6.7   ALBUMIN 3.8 3.7 4.0   GLOBULIN 2.4 2.1 2.7   ALT (SGPT) 44* 20 22   AST (SGOT) 42* 16 18   BILIRUBIN 0.6 0.3 0.4   ALK PHOS 76 80 86   LIPASE  --   --  51         Lab 08/28/23  0950 08/26/23  1239 08/26/23  1038   PROBNP  --   --  97.5   HSTROP T  --  10 10   PROTIME 15.1*  --   --    INR 1.18*  --   --                  Brief Urine Lab Results  (Last result in the past 365 days)        Color   Clarity   Blood   Leuk Est   Nitrite   Protein   CREAT   Urine HCG        08/26/23 1043 Yellow   Clear   Negative   Negative   Negative   Negative                   Microbiology Results Abnormal       None            Adult Transthoracic Echo Complete W/ Cont if Necessary Per Protocol    Result Date: 8/27/2023    Left ventricular ejection fraction appears to be 56 - 60%.   The left ventricular cavity is dilated.   The right ventricular cavity is mildly dilated.   The left atrial cavity is mildly dilated.   The right atrial cavity is mildly  dilated.   Estimated right ventricular systolic pressure from tricuspid regurgitation is normal (<35 mmHg).     Duplex Venous Lower Extremity - Bilateral CAR    Result Date: 8/27/2023    Acute left lower extremity deep vein thrombosis noted in the posterial tibial.      Results for orders placed during the hospital encounter of 08/26/23    Adult Transthoracic Echo Complete W/ Cont if Necessary Per Protocol    Interpretation Summary    Left ventricular  ejection fraction appears to be 56 - 60%.    The left ventricular cavity is dilated.    The right ventricular cavity is mildly dilated.    The left atrial cavity is mildly dilated.    The right atrial cavity is mildly  dilated.    Estimated right ventricular systolic pressure from tricuspid regurgitation is normal (<35 mmHg).      Current medications:  Scheduled Meds:amLODIPine, 5 mg, Oral, Daily  losartan, 100 mg, Oral, Q24H  pantoprazole, 40 mg, Oral, Daily  piperacillin-tazobactam, 3.375 g, Intravenous, Q8H      Continuous Infusions:heparin, 13 Units/kg/hr, Last Rate: 13 Units/kg/hr (08/28/23 1054)  Pharmacy to Dose Heparin,   sodium chloride, 9 mL/hr, Last Rate: 9 mL/hr (08/27/23 1020)      PRN Meds:.  hydrALAZINE    HYDROcodone-acetaminophen    ibuprofen    Magnesium Standard Dose Replacement - Follow Nurse / BPA Driven Protocol    Morphine    ondansetron    Pharmacy to Dose Heparin    Sodium Chloride (PF)    sodium chloride    Assessment & Plan   Assessment & Plan     Active Hospital Problems    Diagnosis  POA    **Pulmonary embolism [I26.99]  Yes    Gallstones [K80.20]  Yes    Essential hypertension [I10]  Yes    Obesity (BMI 30-39.9) [E66.9]  Unknown    Hyperlipidemia [E78.5]  Yes      Resolved Hospital Problems   No resolved problems to display.        Brief Hospital Course to date:  Juan Suero is a 63 y.o. male w/ hx htn, hl who presented with 3 day sof ruq pain, somewhat worsened after eating and sometimes w/ pleurisy. Ct angio chest revealed TARIK and RLL segmental PE's (no strain). Incidentally noted gallstones. Lipase, lft's normal. Initiated on heparin drip. Dr. Cuadra of gen surgery consulted. Echo was normal. Venous duplex showed LLE calf dvt. Heparin drip was held, went for lap ccy on 8/27/23 showing acute on chronic cholecystitis.      Bilateral, segmental Pulmonary emboli (without heart strain, without hypoxia)  LLE DVT (posterior tibial vein)  -likely due to immobility due to frequent traveling  "via planes and car to [place]  -CT angio chest: several pulmonary emboli within left upper and right lower segmental branches (lungs clear; incidental gallstones)  -echo normal  -BLE venous duplex preliminary: LLE posterior tibial v. dvt  -initiated on heparin drip evening 8/26/23; heparin drip stopped for ccy 8/27/23, then placed on prophylactic dose sq heparin immediately post-op per surgery recs  -8/28/23: ok'd to restart heparin drip by surgery. Ultimately plan to change to eliquis/noac at discharge    Acute on chronic cholecystitis (s/p lap ccy this admission)  -s/p lap ccy 8/27/23; discussed w/ Dr. Cuadra post-operatively, keeping off of heparin drip tonight but placing on dvt prophylaxis dose heparin. Patient may require GI consultation tomorrow, he will assess in the morning  -Dr. Cuadra following: zosyn (duration of abx TBD) per surgery, advancing diet, ok'd to restart heparin drip (no bolus)    HTN  Bradycardia  -holding atenolol  -continue amlodipine    CKD (baseline cr ~1.3)  -creatinine stable    4 am labs: cbc, cmp, mag    Expected Discharge Location and Transportation: home  Expected Discharge 1-2 days when cleared by surgery post-operatively and tolerating anticoagulation      DVT prophylaxis:  Medical DVT prophylaxis orders are present.     AM-PAC 6 Clicks Score (PT): 24 (08/28/23 0816)    CODE STATUS:   There are no questions and answers to display.       Kenny Kolb MD  08/28/23        Electronically signed by Kenny Kolb MD at 08/28/23 1611       Lai Cuadra MD at 08/28/23 0990          General Surgery Post op Follow Up Note    Subjective:   Feeling a bit better today.    /84   Pulse 68   Temp 98.1 °F (36.7 °C) (Oral)   Resp 18   Ht 177.8 cm (70\")   Wt 113 kg (249 lb 1.9 oz)   SpO2 92%   BMI 35.74 kg/m²     General Appearance: Minimal distress  Abdomen: incision is clean and dry x3, CORNELIA drain benign    CBC  Results from last 7 days   Lab Units " 23  0356   WBC 10*3/mm3 13.18*   HEMOGLOBIN g/dL 13.4   HEMATOCRIT % 41.3   PLATELETS 10*3/mm3 277       CMP/BMP  Results from last 7 days   Lab Units 23  0356   SODIUM mmol/L 137   POTASSIUM mmol/L 4.1   CHLORIDE mmol/L 103   CO2 mmol/L 24.0   BUN mg/dL 14   CREATININE mg/dL 1.13   CALCIUM mg/dL 8.3*   BILIRUBIN mg/dL 0.6   ALK PHOS U/L 76   ALT (SGPT) U/L 44*   AST (SGOT) U/L 42*   GLUCOSE mg/dL 144*         ASSESSMENT/PLAN:    Postop day 1 laparoscopic cholecystectomy    Advance to a low-fat diet.  May resume his heparin drip, no bolus.  We will continue IV antibiotics for now.  Mobilize.    Lai Cuadra MD  2023  09:40 EDT     Electronically signed by Lai Cuadra MD at 23 0941       [place] MD at 23 1404              [facility] Medicine Services  PROGRESS NOTE    Patient Name: Juan Suero  : 1960  MRN: [id number]    Date of Admission: 2023  Primary Care Physician: Provider, No Known    Subjective   Subjective     CC:  Gallstones, a/c cholecystitis, PE    HPI:  Seen before surgery  No dyspnea  No palpitations  Ruq pain w/ deep inspiration    ROS:  No f/c  No dysuria    Objective   Objective     Vital Signs:   Temp:  [97.1 °F (36.2 °C)-98.3 °F (36.8 °C)] 97.1 °F (36.2 °C)  Heart Rate:  [40-65] 50  Resp:  [16-18] 18  BP: (133-187)/(62-99) 181/82     Physical Exam:  Constitutional:Alert, oriented x 3, nontoxic appearing  Psych:Normal/appropriate affect  HEENT:NCAT, oropharynx clear  Neck: neck supple, full range of motion  Neuro: Face symmetric, speech clear, equal , moves all extremities  Cardiac: RRR; No pretibial pitting edema  Resp: CTAB, normal effort  GI: abd soft, ruq tenderness, no rebound  Skin: No extremity rash  Musculoskeletal/extremities: no cyanosis of extremities; no significant ankle edema      Results Reviewed:  LAB RESULTS:      Lab 23  0510 23  2112 23  1038   WBC 6.55  --   7.64   HEMOGLOBIN 13.6  --  14.6   HEMATOCRIT 41.9  --  44.8   PLATELETS 240  --  294   NEUTROS ABS 4.53  --  6.10   IMMATURE GRANS (ABS) 0.02  --  0.03   LYMPHS ABS 1.16  --  0.75   MONOS ABS 0.71  --  0.68   EOS ABS 0.10  --  0.05   MCV 88.0  --  88.4   LACTATE  --   --  1.3   HEPARIN ANTI-XA  --  0.47  --          Lab 08/27/23  0510 08/26/23  1038   SODIUM 143 142   POTASSIUM 4.6 4.3   CHLORIDE 110* 109*   CO2 25.0 23.0   ANION GAP 8.0 10.0   BUN 14 17   CREATININE 1.22 1.28*   EGFR 65.8 62.1   GLUCOSE 91 112*   CALCIUM 8.5* 8.7   MAGNESIUM 2.1  --          Lab 08/27/23  0510 08/26/23  1038   TOTAL PROTEIN 5.8* 6.7   ALBUMIN 3.7 4.0   GLOBULIN 2.1 2.7   ALT (SGPT) 20 22   AST (SGOT) 16 18   BILIRUBIN 0.3 0.4   ALK PHOS 80 86   LIPASE  --  51         Lab 08/26/23  1239 08/26/23  1038   PROBNP  --  97.5   HSTROP T 10 10                 Brief Urine Lab Results  (Last result in the past 365 days)        Color   Clarity   Blood   Leuk Est   Nitrite   Protein   CREAT   Urine HCG        08/26/23 1043 Yellow   Clear   Negative   Negative   Negative   Negative                   Microbiology Results Abnormal       None            Adult Transthoracic Echo Complete W/ Cont if Necessary Per Protocol    Result Date: 8/27/2023    Left ventricular ejection fraction appears to be 56 - 60%.   The left ventricular cavity is dilated.   The right ventricular cavity is mildly dilated.   The left atrial cavity is mildly dilated.   The right atrial cavity is mildly  dilated.   Estimated right ventricular systolic pressure from tricuspid regurgitation is normal (<35 mmHg).     CT Abdomen Pelvis With Contrast    Result Date: 8/26/2023  CT ANGIOGRAM CHEST, CT ABDOMEN PELVIS W CONTRAST Date of Exam: 8/26/2023 12:49 PM EDT Indication: PE. HISTORY OF HISTO.. Comparison: None available. Technique: CTA of the chest abdomen and pelvis was performed after the uneventful intravenous administration of 85 mL Isovue-370. Reconstructed coronal and  sagittal images were also obtained. In addition, a 3-D volume rendered image was created for interpretation. Automated exposure control and iterative reconstruction methods were used. Findings: Chest: The central tracheobronchial tree is clear. A couple benign calcified granulomas are noted. There is no focal consolidation. There is no pleural effusion. The heart is enlarged. The great vessels are normal in caliber. There are several pulmonary emboli within left upper and right lower lobe segmental branches. There is no evidence of acute right heart strain. No abnormally enlarged lymph nodes are identified. No aggressive osseous lesions are identified. Abdomen/pelvis: The liver, adrenal glands, left kidney, spleen, and pancreas are unremarkable. There are bulky stones in the gallbladder. There is a small right renal cyst. The stomach appears normal. The small bowel appears normal in caliber and configuration. The colon appears normal. The appendix appears normal. There is no ascites or loculated collection. No abnormally enlarged lymph nodes are identified. The rectum, prostate, and urinary bladder are unremarkable. No aggressive osseous lesions are identified.     Impression: Impression: 1.Several pulmonary emboli within left upper and right lower lobe segmental branches. 2.Lungs are clear. 3.Cardiomegaly. 4.No acute process identified within abdomen/pelvis 5.Cholelithiasis. Results were discussed with DONNA Wiley at time of dictation. Electronically Signed: Jonathon Laws MD  8/26/2023 1:20 PM EDT  Workstation ID: [id number]    CT Angiogram Chest    Result Date: 8/26/2023  CT ANGIOGRAM CHEST, CT ABDOMEN PELVIS W CONTRAST Date of Exam: 8/26/2023 12:49 PM EDT Indication: PE. HISTORY OF HISTO.. Comparison: None available. Technique: CTA of the chest abdomen and pelvis was performed after the uneventful intravenous administration of 85 mL Isovue-370. Reconstructed coronal and sagittal images were also obtained.  In addition, a 3-D volume rendered image was created for interpretation. Automated exposure control and iterative reconstruction methods were used. Findings: Chest: The central tracheobronchial tree is clear. A couple benign calcified granulomas are noted. There is no focal consolidation. There is no pleural effusion. The heart is enlarged. The great vessels are normal in caliber. There are several pulmonary emboli within left upper and right lower lobe segmental branches. There is no evidence of acute right heart strain. No abnormally enlarged lymph nodes are identified. No aggressive osseous lesions are identified. Abdomen/pelvis: The liver, adrenal glands, left kidney, spleen, and pancreas are unremarkable. There are bulky stones in the gallbladder. There is a small right renal cyst. The stomach appears normal. The small bowel appears normal in caliber and configuration. The colon appears normal. The appendix appears normal. There is no ascites or loculated collection. No abnormally enlarged lymph nodes are identified. The rectum, prostate, and urinary bladder are unremarkable. No aggressive osseous lesions are identified.     Impression: Impression: 1.Several pulmonary emboli within left upper and right lower lobe segmental branches. 2.Lungs are clear. 3.Cardiomegaly. 4.No acute process identified within abdomen/pelvis 5.Cholelithiasis. Results were discussed with DONNA Wiley at time of dictation. Electronically Signed: Jonathon Laws MD  8/26/2023 1:20 PM EDT  Workstation ID: [id number]     Results for orders placed during the hospital encounter of 08/26/23    Adult Transthoracic Echo Complete W/ Cont if Necessary Per Protocol    Interpretation Summary    Left ventricular ejection fraction appears to be 56 - 60%.    The left ventricular cavity is dilated.    The right ventricular cavity is mildly dilated.    The left atrial cavity is mildly dilated.    The right atrial cavity is mildly  dilated.     Estimated right ventricular systolic pressure from tricuspid regurgitation is normal (<35 mmHg).      Current medications:  Scheduled Meds:amLODIPine, 5 mg, Oral, Daily  fentaNYL citrate (PF), , ,   [MAR Hold] pantoprazole, 40 mg, Oral, Daily      Continuous Infusions:Pharmacy to Dose Heparin,   sodium chloride, 9 mL/hr, Last Rate: 9 mL/hr (08/27/23 1020)      PRN Meds:.  fentanyl    hydrALAZINE    [MAR Hold] HYDROcodone-acetaminophen    HYDROmorphone    lactated ringers    Magnesium Standard Dose Replacement - Follow Nurse / BPA Driven Protocol    [MAR Hold] Morphine    [MAR Hold] ondansetron    ondansetron    Pharmacy to Dose Heparin    [MAR Hold] Sodium Chloride (PF)    sodium chloride    Assessment & Plan   Assessment & Plan     Active Hospital Problems    Diagnosis  POA    **Pulmonary embolism [I26.99]  Yes    Gallstones [K80.20]  Yes    Essential hypertension [I10]  Yes    Obesity (BMI 30-39.9) [E66.9]  Unknown    Hyperlipidemia [E78.5]  Yes      Resolved Hospital Problems   No resolved problems to display.        Brief Hospital Course to date:  Juan Suero is a 63 y.o. male w/ hx htn, hl who presented with 3 day sof ruq pain, somewhat worsened after eating and sometimes w/ pleurisy. Ct angio chest revealed TARIK and RLL segmental PE's (no strain). Incidentally noted gallstones. Lipase, lft's normal. Initiated on heparin drip. Dr. Cuadra of gen surgery consulted. Echo was normal. Venous duplex showed LLE calf dvt. Heparin drip was held, went for lap ccy on 8/27/23 showing acute on chronic cholecystitis.      Bilateral, segmental Pulmonary emboli (without heart strain, without hypoxia)  LLE DVT  -likely due to immobility due to frequent traveling via planes and car to [place]  -CT angio chest: several pulmonary emboli within left upper and right lower segmental branches (lungs clear; incidental gallstones)  -echo normal  -BLE venous duplex preliminary: LLE calf dvt  -initiated on heparin drip evening 8/26/23;  heparin drip stopped for ccy 8/27/23  -post-operatively surgery is holding full dose heparin drip overnight, instead placing on dvt prophylaxis dose sq heparin overnight; may require further intervention/gi consultation  -restart full anticoagulation when ok w/ surgery (see below)    Acute on chronic cholecystitis  -s/p lap ccy 8/27/23; discussed w/ Dr. Cuadra post-operatively, keeping off of heparin drip tonight but placing on dvt prophylaxis dose heparin. Patient may require GI consultation tomorrow, he will assess in the morning    HTN  Bradycardia  -holding atenolol  -continue amlodipine    CKD (baseline cr ~1.3)    Am labs: cbc, cmp, mag    Expected Discharge Location and Transportation: home  Expected Discharge 1-2 days when cleared by surgery post-operatively and tolerating anticoagulation      DVT prophylaxis:  Medical DVT prophylaxis orders are present.     AM-PAC 6 Clicks Score (PT): 24 (08/27/23 0800)    CODE STATUS:   There are no questions and answers to display.       Kenny Kolb MD  08/27/23        Electronically signed by Kenny Kolb MD at 08/27/23 1421          Consult Notes (all)        Lai Cuadra MD at 08/26/23 1933        Consult Orders    1. Inpatient General Surgery Consult [[id number]] ordered by Aurelia Alonso MD at 08/26/23 1544                 General Surgery Consultation Note    Date of Service: 8/26/2023  Juan Pio  [id number]  1960      Referring Provider: Aurelia Alonso MD    Location of Consult: Inpatient     Reason for Consultation: Symptomatic cholelithiasis       History of Present Illness:  I am seeing, Juan Suero, in consultation for Aurelia Alonso MD regarding symptomatic cholelithiasis.  63-year-old gentleman with hypertension, hypercholesterolemia, along with known gallstones presents with 2 days of right upper quadrant abdominal pain.  This began after eating breakfast and has persisted x48 hours.  At the behest of his daughter  he came for evaluation.  He was worked up in the emergency room with a CT scan of the chest, abdomen, and pelvis which demonstrated bilateral pulmonary emboli and cholelithiasis.  He denies any significant shortness of breath currently.  He does have some pleuritic discomfort.  Otherwise he denies nausea, vomiting, scleral icterus, dark urine, or acholic stools.    Problems Addressed this Visit          Coag and Thromboembolic    * (Principal) Pulmonary embolism - Primary       Gastrointestinal Abdominal     Gallstones     Diagnoses         Codes Comments    Other acute pulmonary embolism without acute cor pulmonale    -  Primary ICD-10-CM: I26.99  ICD-9-CM: 415.19     Gallstones     ICD-10-CM: K80.20  ICD-9-CM: 574.20             Past Medical History:   Diagnosis Date    Hyperlipidemia     Hypertension        Past Surgical History:    HERNIA REPAIR    WISDOM TOOTH EXTRACTION       Allergies   Allergen Reactions    Sulfa Antibiotics Hives       No current facility-administered medications on file prior to encounter.     Current Outpatient Medications on File Prior to Encounter   Medication Sig Dispense Refill    amLODIPine (NORVASC) 5 MG tablet Take 1 tablet by mouth Daily.      aspirin 81 MG EC tablet Take 1 tablet by mouth Daily.      atenolol (TENORMIN) 50 MG tablet Take 1 tablet by mouth Daily.      cholecalciferol (VITAMIN D3) 25 MCG (1000 UT) tablet Take 1 tablet by mouth Daily.      losartan (COZAAR) 100 MG tablet Take 1 tablet by mouth Daily.  1    multivitamin with minerals tablet tablet Take 1 tablet by mouth Daily.      naproxen (NAPROSYN) 500 MG tablet Take 1 tablet by mouth 2 (Two) Times a Day With Meals.      pantoprazole (PROTONIX) 40 MG EC tablet Take 1 tablet by mouth Daily.      [DISCONTINUED] sildenafil (VIAGRA) 100 MG tablet TAKE ONE HALF TO ONE TABLET BY MOUTH 1/2 HOUR BEFORE SEXUAL INTERCOURSE           Current Facility-Administered Medications:     amLODIPine (NORVASC) tablet 5 mg, 5 mg, Oral,  Daily, Aurelia Alonso MD, 5 mg at 08/26/23 1609    atenolol (TENORMIN) tablet 50 mg, 50 mg, Oral, Daily, Aurelia Alonso MD    heparin 31828 units/250 mL (100 units/mL) in 0.45 % NaCl infusion, 13.5 Units/kg/hr, Intravenous, Titrated, Marty Lee APRN, Last Rate: 15.25 mL/hr at 08/26/23 1609, 13.5 Units/kg/hr at 08/26/23 1609    HYDROcodone-acetaminophen (NORCO) 5-325 MG per tablet 1 tablet, 1 tablet, Oral, Q4H PRN, Aurelia Alonso MD    morphine injection 2 mg, 2 mg, Intravenous, Q2H PRN, Aurelia Alonso MD    ondansetron (ZOFRAN) injection 4 mg, 4 mg, Intravenous, Q6H PRN, Aurelia Alonso MD    pantoprazole (PROTONIX) EC tablet 40 mg, 40 mg, Oral, Daily, Aurelia Alonso MD, 40 mg at 08/26/23 1609    Pharmacy to Dose Heparin, , Does not apply, Continuous PRN, Marty Lee APRN    Sodium Chloride (PF) 0.9 % 10 mL, 10 mL, Intravenous, PRN, Jonathon Martínez MD    History reviewed. No pertinent family history.  Social History     Socioeconomic History    Marital status:    Tobacco Use    Smoking status: Never    Smokeless tobacco: Never   Vaping Use    Vaping Use: Never used   Substance and Sexual Activity    Alcohol use: Yes    Drug use: Defer    Sexual activity: Defer       Review of Systems:  Review of Systems   Constitutional:  Negative for appetite change and fever.   HENT:  Negative for drooling, facial swelling and mouth sores.    Eyes:  Negative for photophobia and visual disturbance.   Respiratory:  Negative for cough and shortness of breath.    Cardiovascular:  Negative for palpitations and leg swelling.   Gastrointestinal:  Positive for abdominal pain. Negative for nausea and vomiting.   Endocrine: Negative for polyphagia and polyuria.   Genitourinary:  Negative for difficulty urinating, flank pain and urgency.   Musculoskeletal:  Negative for arthralgias, joint swelling and neck pain.   Skin:  Negative for pallor and rash.   Allergic/Immunologic: Negative for immunocompromised  "state.   Neurological:  Negative for tremors, seizures and headaches.   Hematological:  Negative for adenopathy.   Psychiatric/Behavioral:  Negative for confusion and sleep disturbance. The patient is not hyperactive.    Otherwise the 12 point review of systems is negative.    BP (!) 187/99 (BP Location: Right arm, Patient Position: Lying)   Pulse (!) 44   Temp 98.3 °F (36.8 °C) (Oral)   Resp 18   Ht 177.8 cm (70\")   Wt 113 kg (250 lb)   SpO2 93%   BMI 35.87 kg/m²   Body mass index is 35.87 kg/m².    General: Minimal distress  HEENT: PER, no icterus, normal sclerae  Cardiac: regular rhythm,  no audible rubs  Pulmonary: bilateral breath sounds, nonlabored  Abdominal: Soft, right upper quadrant tenderness, no generalized peritonitis  Neurologic: awake, alert, no obvious focal deficits  Extremities: warm, no edema  Skin: no obvious rashes nor worrisome lesions seen     CBC  Results from last 7 days   Lab Units 08/26/23  1038   WBC 10*3/mm3 7.64   HEMOGLOBIN g/dL 14.6   HEMATOCRIT % 44.8   PLATELETS 10*3/mm3 294       CMP  Results from last 7 days   Lab Units 08/26/23  1038   SODIUM mmol/L 142   POTASSIUM mmol/L 4.3   CHLORIDE mmol/L 109*   CO2 mmol/L 23.0   BUN mg/dL 17   CREATININE mg/dL 1.28*   CALCIUM mg/dL 8.7   BILIRUBIN mg/dL 0.4   ALK PHOS U/L 86   ALT (SGPT) U/L 22   AST (SGOT) U/L 18   GLUCOSE mg/dL 112*       Radiology  Imaging Results (Last 72 Hours)       Procedure Component Value Units Date/Time    CT Angiogram Chest [[id number]] Collected: 08/26/23 1306     Updated: 08/26/23 1323    Narrative:      CT ANGIOGRAM CHEST, CT ABDOMEN PELVIS W CONTRAST    Date of Exam: 8/26/2023 12:49 PM EDT    Indication: PE. HISTORY OF HISTO..    Comparison: None available.    Technique: CTA of the chest abdomen and pelvis was performed after the uneventful intravenous administration of 85 mL Isovue-370. Reconstructed coronal and sagittal images were also obtained. In addition, a 3-D volume rendered image was created " for   interpretation. Automated exposure control and iterative reconstruction methods were used.      Findings:  Chest:    The central tracheobronchial tree is clear. A couple benign calcified granulomas are noted. There is no focal consolidation. There is no pleural effusion.    The heart is enlarged. The great vessels are normal in caliber. There are several pulmonary emboli within left upper and right lower lobe segmental branches. There is no evidence of acute right heart strain. No abnormally enlarged lymph nodes are   identified.    No aggressive osseous lesions are identified.    Abdomen/pelvis:    The liver, adrenal glands, left kidney, spleen, and pancreas are unremarkable. There are bulky stones in the gallbladder. There is a small right renal cyst.    The stomach appears normal. The small bowel appears normal in caliber and configuration. The colon appears normal. The appendix appears normal. There is no ascites or loculated collection. No abnormally enlarged lymph nodes are identified.    The rectum, prostate, and urinary bladder are unremarkable.    No aggressive osseous lesions are identified.      Impression:      Impression:  1.Several pulmonary emboli within left upper and right lower lobe segmental branches.  2.Lungs are clear.  3.Cardiomegaly.  4.No acute process identified within abdomen/pelvis  5.Cholelithiasis.    Results were discussed with DONNA Wiley at time of dictation.    Electronically Signed: Jonathon Laws MD    8/26/2023 1:20 PM EDT    Workstation ID: [id number]    CT Abdomen Pelvis With Contrast [[id number]] Collected: 08/26/23 1306     Updated: 08/26/23 1323    Narrative:      CT ANGIOGRAM CHEST, CT ABDOMEN PELVIS W CONTRAST    Date of Exam: 8/26/2023 12:49 PM EDT    Indication: PE. HISTORY OF HISTO..    Comparison: None available.    Technique: CTA of the chest abdomen and pelvis was performed after the uneventful intravenous administration of 85 mL Isovue-370. Reconstructed  coronal and sagittal images were also obtained. In addition, a 3-D volume rendered image was created for   interpretation. Automated exposure control and iterative reconstruction methods were used.      Findings:  Chest:    The central tracheobronchial tree is clear. A couple benign calcified granulomas are noted. There is no focal consolidation. There is no pleural effusion.    The heart is enlarged. The great vessels are normal in caliber. There are several pulmonary emboli within left upper and right lower lobe segmental branches. There is no evidence of acute right heart strain. No abnormally enlarged lymph nodes are   identified.    No aggressive osseous lesions are identified.    Abdomen/pelvis:    The liver, adrenal glands, left kidney, spleen, and pancreas are unremarkable. There are bulky stones in the gallbladder. There is a small right renal cyst.    The stomach appears normal. The small bowel appears normal in caliber and configuration. The colon appears normal. The appendix appears normal. There is no ascites or loculated collection. No abnormally enlarged lymph nodes are identified.    The rectum, prostate, and urinary bladder are unremarkable.    No aggressive osseous lesions are identified.      Impression:      Impression:  1.Several pulmonary emboli within left upper and right lower lobe segmental branches.  2.Lungs are clear.  3.Cardiomegaly.  4.No acute process identified within abdomen/pelvis  5.Cholelithiasis.    Results were discussed with DONNA Wiley at time of dictation.    Electronically Signed: Jonathon Laws MD    8/26/2023 1:20 PM EDT    Workstation ID: [id number]              Assessment:  Symptomatic cholelithiasis with persistent pain  Pulmonary emboli  Hypertension    Plan:  I reviewed the CT scan of the chest, abdomen, and pelvis personally.  I reviewed his blood work.  Regarding his continued symptoms and gallstones I do think that it is appropriate for cholecystectomy.   Regarding his current pulmonary emboli and he has no evidence of right heart strain, he is not on supplemental oxygen.  And he does not feel shortness of breath at the moment.  I am going to hold his heparin drip at midnight.  I discussed the risks and benefits associated with cholecystectomy including, but not limited to: bleeding, infection, injury to adjacent viscera (duodenum, common bile duct, etc.), worsening respiratory failure, retained stones, need for ERCP, an open operation in general, bile leak, biloma formation, chronic pain, incisional hernia formation, and medical issues from a cardiopulmonary deep venous thrombosis standpoint.  His daughter was also present for our examination and conversation.  All questions were answered, they understand, and wish to proceed with cholecystectomy.  N.p.o., IV antibiotics, hold heparin drip at midnight, and cholecystectomy.      Lai Cuadra MD  08/26/23  19:34 EDT       Electronically signed by Lai Cuadra MD at 08/26/23 7395

## 2023-08-30 ENCOUNTER — READMISSION MANAGEMENT (OUTPATIENT)
Dept: CALL CENTER | Facility: HOSPITAL | Age: 63
End: 2023-08-30
Payer: COMMERCIAL

## 2023-08-30 VITALS
HEIGHT: 70 IN | HEART RATE: 69 BPM | OXYGEN SATURATION: 94 % | BODY MASS INDEX: 34.79 KG/M2 | SYSTOLIC BLOOD PRESSURE: 150 MMHG | DIASTOLIC BLOOD PRESSURE: 90 MMHG | TEMPERATURE: 98.2 F | RESPIRATION RATE: 16 BRPM | WEIGHT: 243 LBS

## 2023-08-30 PROBLEM — I26.99 PULMONARY EMBOLISM: Status: RESOLVED | Noted: 2023-08-26 | Resolved: 2023-08-30

## 2023-08-30 PROBLEM — K80.20 GALLSTONES: Status: RESOLVED | Noted: 2023-08-26 | Resolved: 2023-08-30

## 2023-08-30 LAB
ANION GAP SERPL CALCULATED.3IONS-SCNC: 8 MMOL/L (ref 5–15)
BUN SERPL-MCNC: 16 MG/DL (ref 8–23)
BUN/CREAT SERPL: 12.9 (ref 7–25)
CALCIUM SPEC-SCNC: 8.7 MG/DL (ref 8.6–10.5)
CHLORIDE SERPL-SCNC: 106 MMOL/L (ref 98–107)
CO2 SERPL-SCNC: 27 MMOL/L (ref 22–29)
CREAT SERPL-MCNC: 1.24 MG/DL (ref 0.76–1.27)
DEPRECATED RDW RBC AUTO: 42.9 FL (ref 37–54)
EGFRCR SERPLBLD CKD-EPI 2021: 64.5 ML/MIN/1.73
ERYTHROCYTE [DISTWIDTH] IN BLOOD BY AUTOMATED COUNT: 13 % (ref 12.3–15.4)
GLUCOSE SERPL-MCNC: 98 MG/DL (ref 65–99)
HCT VFR BLD AUTO: 38.8 % (ref 37.5–51)
HGB BLD-MCNC: 12.7 G/DL (ref 13–17.7)
MCH RBC QN AUTO: 29.1 PG (ref 26.6–33)
MCHC RBC AUTO-ENTMCNC: 32.7 G/DL (ref 31.5–35.7)
MCV RBC AUTO: 88.8 FL (ref 79–97)
PLATELET # BLD AUTO: 251 10*3/MM3 (ref 140–450)
PMV BLD AUTO: 10.3 FL (ref 6–12)
POTASSIUM SERPL-SCNC: 4 MMOL/L (ref 3.5–5.2)
RBC # BLD AUTO: 4.37 10*6/MM3 (ref 4.14–5.8)
SODIUM SERPL-SCNC: 141 MMOL/L (ref 136–145)
UFH PPP CHRO-ACNC: 0.1 IU/ML (ref 0.3–0.7)
WBC NRBC COR # BLD: 6.94 10*3/MM3 (ref 3.4–10.8)

## 2023-08-30 PROCEDURE — 85520 HEPARIN ASSAY: CPT

## 2023-08-30 PROCEDURE — 85027 COMPLETE CBC AUTOMATED: CPT | Performed by: INTERNAL MEDICINE

## 2023-08-30 PROCEDURE — 80048 BASIC METABOLIC PNL TOTAL CA: CPT | Performed by: INTERNAL MEDICINE

## 2023-08-30 RX ORDER — HYDROCODONE BITARTRATE AND ACETAMINOPHEN 5; 325 MG/1; MG/1
1 TABLET ORAL EVERY 4 HOURS PRN
Qty: 18 TABLET | Refills: 0 | Status: SHIPPED | OUTPATIENT
Start: 2023-08-30 | End: 2023-09-02

## 2023-08-30 RX ORDER — AMOXICILLIN 250 MG
2 CAPSULE ORAL 2 TIMES DAILY
Qty: 20 TABLET | Refills: 0 | Status: SHIPPED | OUTPATIENT
Start: 2023-08-30

## 2023-08-30 RX ADMIN — HYDROCODONE BITARTRATE AND ACETAMINOPHEN 1 TABLET: 5; 325 TABLET ORAL at 11:33

## 2023-08-30 RX ADMIN — LOSARTAN POTASSIUM 100 MG: 50 TABLET, FILM COATED ORAL at 08:04

## 2023-08-30 RX ADMIN — APIXABAN 10 MG: 5 TABLET, FILM COATED ORAL at 10:49

## 2023-08-30 RX ADMIN — PANTOPRAZOLE SODIUM 40 MG: 40 TABLET, DELAYED RELEASE ORAL at 08:04

## 2023-08-30 RX ADMIN — AMLODIPINE BESYLATE 5 MG: 5 TABLET ORAL at 08:04

## 2023-08-30 RX ADMIN — SENNOSIDES AND DOCUSATE SODIUM 2 TABLET: 8.6; 5 TABLET ORAL at 08:04

## 2023-08-30 NOTE — OUTREACH NOTE
Prep Survey      Flowsheet Row Responses   [facility] patient discharged from? [place]   Is LACE score < 7 ? No   Eligibility [facility]   Date of Admission 08/26/23   Date of Discharge 08/30/23   Discharge Disposition Home or Self Care   Discharge diagnosis Pulmonary emboli,Acute on chronic cholecystitis (s/p lap ccy this admission)   Does the patient have one of the following disease processes/diagnoses(primary or secondary)? General Surgery   Does the patient have Home health ordered? No   Is there a DME ordered? No   Comments regarding appointments new PCP appt   Medication alerts for this patient See AVS for new meds--AC--see dosing schedule, pain meds   General alerts for this patient Pt has home in [place] but may be moving to [place]   Prep survey completed? Yes            Elida WHEATLEY - Registered Nurse

## 2023-08-30 NOTE — DISCHARGE INSTRUCTIONS
You may not drive within 24 hour of receiving narcotic pain medication.  You may return to work/school in 2 weeks. Follow restrictions.  Restrictions of no excessive twisting/bending/lifting greater than 15 lbs for 6 weeks.  You shower after 24 hours from the surgical procedure.   No tub baths, do not submerge the incision underwater in a tub bath etc.    Remove the Steri-Strips after 5 days.     Follow-up at [facility] in 2 weeks.

## 2023-08-30 NOTE — DISCHARGE SUMMARY
[facility] Medicine Services  DISCHARGE SUMMARY    Patient Name: Juan Suero  : 1960  MRN: [id number]    Date of Admission: 2023 10:22 AM  Date of Discharge:    Primary Care Physician: Provider, No Known    Consults       Date and Time Order Name Status Description    2023  3:44 PM Inpatient General Surgery Consult Completed             Hospital Course     Presenting Problem: SOA    Active Hospital Problems    Diagnosis  POA    Essential hypertension [I10]  Yes    Obesity (BMI 30-39.9) [E66.9]  Yes    Hyperlipidemia [E78.5]  Yes      Resolved Hospital Problems    Diagnosis Date Resolved POA    Pulmonary embolism [I26.99] 2023 Yes    Gallstones [K80.20] 2023 Yes          Hospital Course:  Juan Suero is a 63 y.o. male w/ hx htn, hl who presented with 3 day sof ruq pain, somewhat worsened after eating and sometimes w/ pleurisy. Ct angio chest revealed TARIK and RLL segmental PE's (no strain). Incidentally noted gallstones. Lipase, lft's normal. Initiated on heparin drip. Dr. Cuadra of gen surgery consulted. Echo was normal. Venous duplex showed LLE calf dvt. Heparin drip was held, went for lap ccy on 23 showing acute on chronic cholecystitis.     Acute bilateral, segmental Pulmonary emboli w/ hypoxia  LLE DVT (posterior tibial vein)  -Etiology of vte likely due to immobility due to frequent traveling via planes and car to [place]  -CT angio chest: several pulmonary emboli within left upper and right lower segmental branches (lungs clear; incidental gallstones). Echo normal  -Upon arrival was initiated on heparin drip evening 23; heparin drip stopped for ccy 23, then placed on prophylactic dose sq heparin immediately post-op per surgery recs. IV heparin resumed post op which he tolerated will. Will continue DOAC for 3-6 months at d/c w/ final duration TBD by PCP.     Acute on chronic cholecystitis (s/p lap ccy this admission)  -Dr. Cuadra  following: -s/p lap ccy 8/27/23. Stopped zosyn 8/29. Did well post operatively. See Dr. Cuadra note from 8/30 for post op recs.    Discharge Follow Up Recommendations for outpatient labs/diagnostics:   PCP in 1 week    Dr. Cuadra in 2 weeks    Day of Discharge     HPI: Up in bed. Doing well. Pain controlled. Tolerating diet. Wants to go home.    Review of Systems  Gen- No fevers, chills  CV- No chest pain, palpitations  Resp- No cough, dyspnea  GI- No N/V/D, abd pain    Vital Signs:   Temp:  [97.7 °F (36.5 °C)-98.3 °F (36.8 °C)] 97.7 °F (36.5 °C)  Heart Rate:  [] 83  Resp:  [18-20] 18  BP: (135-166)/(79-95) 162/91      Physical Exam:  Constitutional: No acute distress, awake, alert  HENT: NCAT, mucous membranes moist  Respiratory: Clear to auscultation bilaterally, respiratory effort normal   Cardiovascular: RRR, no murmurs, rubs, or gallops  Gastrointestinal: Positive bowel sounds, soft, nontender, nondistended, lap sites c/d/I.  Musculoskeletal: No bilateral ankle edema  Psychiatric: Appropriate affect, cooperative  Neurologic: Oriented x 3, strength symmetric in all extremities, Cranial Nerves grossly intact to confrontation, speech clear  Skin: No rashes     Pertinent  and/or Most Recent Results     LAB RESULTS:      Lab 08/30/23  0406 08/29/23  2117 08/29/23  1430 08/29/23  0754 08/29/23  0213 08/28/23  1806 08/28/23  0950 08/28/23  0356 08/27/23  0510 08/26/23  2112 08/26/23  1038   WBC 6.94  --   --   --  12.01*  --   --  13.18* 6.55  --  7.64   HEMOGLOBIN 12.7*  --   --   --  13.2  --   --  13.4 13.6  --  14.6   HEMATOCRIT 38.8  --   --   --  40.9  --   --  41.3 41.9  --  44.8   PLATELETS 251  --   --   --  242  --   --  277 240  --  294   NEUTROS ABS  --   --   --   --  9.87*  --   --   --  4.53  --  6.10   IMMATURE GRANS (ABS)  --   --   --   --  0.05  --   --   --  0.02  --  0.03   LYMPHS ABS  --   --   --   --  0.81  --   --   --  1.16  --  0.75   MONOS ABS  --   --   --   --  1.19*  --   --   --   0.71  --  0.68   EOS ABS  --   --   --   --  0.06  --   --   --  0.10  --  0.05   MCV 88.8  --   --   --  89.1  --   --  89.0 88.0  --  88.4   LACTATE  --   --   --   --   --   --   --   --   --   --  1.3   PROTIME  --   --   --   --   --   --  15.1*  --   --   --   --    APTT  --   --   --   --   --   --  32.8*  --   --   --   --    HEPARIN ANTI-XA 0.10* 0.20* 0.47 0.68 0.60   < > 0.10*  --   --    < >  --     < > = values in this interval not displayed.         Lab 08/30/23  0406 08/29/23 0213 08/28/23  0356 08/27/23  0510 08/26/23  1038   SODIUM 141 141 137 143 142   POTASSIUM 4.0 3.9 4.1 4.6 4.3   CHLORIDE 106 105 103 110* 109*   CO2 27.0 26.0 24.0 25.0 23.0   ANION GAP 8.0 10.0 10.0 8.0 10.0   BUN 16 16 14 14 17   CREATININE 1.24 1.32* 1.13 1.22 1.28*   EGFR 64.5 59.9* 72.1 65.8 62.1   GLUCOSE 98 133* 144* 91 112*   CALCIUM 8.7 8.5* 8.3* 8.5* 8.7   MAGNESIUM  --  2.1 2.0 2.1  --          Lab 08/29/23  0213 08/28/23  0356 08/27/23  0510 08/26/23  1038   TOTAL PROTEIN 6.5 6.2 5.8* 6.7   ALBUMIN 3.6 3.8 3.7 4.0   GLOBULIN 2.9 2.4 2.1 2.7   ALT (SGPT) 48* 44* 20 22   AST (SGOT) 38 42* 16 18   BILIRUBIN 0.6 0.6 0.3 0.4   ALK PHOS 72 76 80 86   LIPASE  --   --   --  51         Lab 08/28/23  0950 08/26/23  1239 08/26/23  1038   PROBNP  --   --  97.5   HSTROP T  --  10 10   PROTIME 15.1*  --   --    INR 1.18*  --   --                  Brief Urine Lab Results  (Last result in the past 365 days)        Color   Clarity   Blood   Leuk Est   Nitrite   Protein   CREAT   Urine HCG        08/26/23 1043 Yellow   Clear   Negative   Negative   Negative   Negative                 Microbiology Results (last 10 days)       Procedure Component Value - Date/Time    MRSA Screen, PCR (Inpatient) - Swab, Nares [[id number]]  (Normal) Collected: 08/28/23 1815    Lab Status: Final result Specimen: Swab from Nares Updated: 08/28/23 2047     MRSA PCR Negative    Narrative:      The negative predictive value of this diagnostic test is high  and should only be used to consider de-escalating anti-MRSA therapy. A positive result may indicate colonization with MRSA and must be correlated clinically.  MRSA Negative            Adult Transthoracic Echo Complete W/ Cont if Necessary Per Protocol    Result Date: 8/27/2023    Left ventricular ejection fraction appears to be 56 - 60%.   The left ventricular cavity is dilated.   The right ventricular cavity is mildly dilated.   The left atrial cavity is mildly dilated.   The right atrial cavity is mildly  dilated.   Estimated right ventricular systolic pressure from tricuspid regurgitation is normal (<35 mmHg).     CT Abdomen Pelvis With Contrast    Result Date: 8/26/2023  CT ANGIOGRAM CHEST, CT ABDOMEN PELVIS W CONTRAST Date of Exam: 8/26/2023 12:49 PM EDT Indication: PE. HISTORY OF HISTO.. Comparison: None available. Technique: CTA of the chest abdomen and pelvis was performed after the uneventful intravenous administration of 85 mL Isovue-370. Reconstructed coronal and sagittal images were also obtained. In addition, a 3-D volume rendered image was created for interpretation. Automated exposure control and iterative reconstruction methods were used. Findings: Chest: The central tracheobronchial tree is clear. A couple benign calcified granulomas are noted. There is no focal consolidation. There is no pleural effusion. The heart is enlarged. The great vessels are normal in caliber. There are several pulmonary emboli within left upper and right lower lobe segmental branches. There is no evidence of acute right heart strain. No abnormally enlarged lymph nodes are identified. No aggressive osseous lesions are identified. Abdomen/pelvis: The liver, adrenal glands, left kidney, spleen, and pancreas are unremarkable. There are bulky stones in the gallbladder. There is a small right renal cyst. The stomach appears normal. The small bowel appears normal in caliber and configuration. The colon appears normal. The  appendix appears normal. There is no ascites or loculated collection. No abnormally enlarged lymph nodes are identified. The rectum, prostate, and urinary bladder are unremarkable. No aggressive osseous lesions are identified.     Impression: 1.Several pulmonary emboli within left upper and right lower lobe segmental branches. 2.Lungs are clear. 3.Cardiomegaly. 4.No acute process identified within abdomen/pelvis 5.Cholelithiasis. Results were discussed with DONNA Wiley at time of dictation. Electronically Signed: Jonathon Laws MD  8/26/2023 1:20 PM EDT  Workstation ID: [id number]    Duplex Venous Lower Extremity - Bilateral CAR    Result Date: 8/27/2023    Acute left lower extremity deep vein thrombosis noted in the posterial tibial.     CT Angiogram Chest    Result Date: 8/26/2023  CT ANGIOGRAM CHEST, CT ABDOMEN PELVIS W CONTRAST Date of Exam: 8/26/2023 12:49 PM EDT Indication: PE. HISTORY OF HISTO.. Comparison: None available. Technique: CTA of the chest abdomen and pelvis was performed after the uneventful intravenous administration of 85 mL Isovue-370. Reconstructed coronal and sagittal images were also obtained. In addition, a 3-D volume rendered image was created for interpretation. Automated exposure control and iterative reconstruction methods were used. Findings: Chest: The central tracheobronchial tree is clear. A couple benign calcified granulomas are noted. There is no focal consolidation. There is no pleural effusion. The heart is enlarged. The great vessels are normal in caliber. There are several pulmonary emboli within left upper and right lower lobe segmental branches. There is no evidence of acute right heart strain. No abnormally enlarged lymph nodes are identified. No aggressive osseous lesions are identified. Abdomen/pelvis: The liver, adrenal glands, left kidney, spleen, and pancreas are unremarkable. There are bulky stones in the gallbladder. There is a small right renal cyst. The  stomach appears normal. The small bowel appears normal in caliber and configuration. The colon appears normal. The appendix appears normal. There is no ascites or loculated collection. No abnormally enlarged lymph nodes are identified. The rectum, prostate, and urinary bladder are unremarkable. No aggressive osseous lesions are identified.     Impression: 1.Several pulmonary emboli within left upper and right lower lobe segmental branches. 2.Lungs are clear. 3.Cardiomegaly. 4.No acute process identified within abdomen/pelvis 5.Cholelithiasis. Results were discussed with DONNA Wiley at time of dictation. Electronically Signed: Jonathon Laws MD  8/26/2023 1:20 PM EDT  Workstation ID: [id number]     Results for orders placed during the hospital encounter of 08/26/23    Duplex Venous Lower Extremity - Bilateral CAR    Interpretation Summary    Acute left lower extremity deep vein thrombosis noted in the posterial tibial.      Results for orders placed during the hospital encounter of 08/26/23    Duplex Venous Lower Extremity - Bilateral CAR    Interpretation Summary    Acute left lower extremity deep vein thrombosis noted in the posterial tibial.      Results for orders placed during the hospital encounter of 08/26/23    Adult Transthoracic Echo Complete W/ Cont if Necessary Per Protocol    Interpretation Summary    Left ventricular ejection fraction appears to be 56 - 60%.    The left ventricular cavity is dilated.    The right ventricular cavity is mildly dilated.    The left atrial cavity is mildly dilated.    The right atrial cavity is mildly  dilated.    Estimated right ventricular systolic pressure from tricuspid regurgitation is normal (<35 mmHg).      Plan for Follow-up of Pending Labs/Results:     Discharge Details        Discharge Medications        New Medications        Instructions Start Date   apixaban 5 MG tablet tablet  Commonly known as: ELIQUIS   10 mg, Oral, Every 12 Hours Scheduled       apixaban 5 MG tablet tablet  Commonly known as: ELIQUIS   5 mg, Oral, Every 12 Hours Scheduled   Start Date: September 6, 2023     HYDROcodone-acetaminophen 5-325 MG per tablet  Commonly known as: NORCO   1 tablet, Oral, Every 4 Hours PRN      sennosides-docusate 8.6-50 MG per tablet  Commonly known as: PERICOLACE   2 tablets, Oral, 2 Times Daily             Continue These Medications        Instructions Start Date   amLODIPine 5 MG tablet  Commonly known as: NORVASC   Take 1 tablet by mouth Daily.      aspirin 81 MG EC tablet   81 mg, Oral, Daily      atenolol 50 MG tablet  Commonly known as: TENORMIN   Take 1 tablet by mouth Daily.      cholecalciferol 25 MCG (1000 UT) tablet  Commonly known as: VITAMIN D3   1,000 Units, Oral, Daily      losartan 100 MG tablet  Commonly known as: COZAAR   100 mg, Oral, Daily      multivitamin with minerals tablet tablet   1 tablet, Oral, Daily      pantoprazole 40 MG EC tablet  Commonly known as: PROTONIX   40 mg, Oral, Daily             Stop These Medications      naproxen 500 MG tablet  Commonly known as: NAPROSYN              Allergies   Allergen Reactions    Sulfa Antibiotics Hives         Discharge Disposition:  Home or Self Care    Diet:  Hospital:  Diet Order   Procedures    Diet: Regular/House Diet, Cardiac Diets, Gastrointestinal Diets; Low Sodium (2g); Fat-Restricted; Texture: Regular Texture (IDDSI 7); Fluid Consistency: Thin (IDDSI 0)       Activity:      Restrictions or Other Recommendations:         CODE STATUS:    There are no questions and answers to display.       No future appointments.    Additional Instructions for the Follow-ups that You Need to Schedule       Discharge Follow-up with PCP   As directed       Currently Documented PCP:    Provider, No Known    PCP Phone Number:    None     Follow Up Details: 1 week hospital follow up                      Billie Lemons II,   08/30/23      Time Spent on Discharge:  I spent  34  minutes on this discharge  activity which included: face-to-face encounter with the patient, reviewing the data in the system, coordination of the care with the nursing staff as well as consultants, documentation, and entering orders.

## 2023-08-30 NOTE — CASE MANAGEMENT/SOCIAL WORK
Continued Stay Note  [facility]     Patient Name: Juan Suero  MRN: [id number]  Today's Date: 8/30/2023    Admit Date: 8/26/2023    Plan:  update   Discharge Plan       Row Name 08/30/23 1513       Plan    Final Discharge Disposition Code 01 - home or self-care    Final Note Patient discharged home - no dme or dc needs identified - elida [phone]                   Discharge Codes    No documentation.                 Expected Discharge Date and Time       Expected Discharge Date Expected Discharge Time    Aug 30, 2023               Elida Barber RN

## 2023-08-30 NOTE — PROGRESS NOTES
"General Surgery Post op Follow Up Note    Subjective:   Tolerating p.o. with GI function    /91 Comment: AM meds given  Pulse 83   Temp 97.7 °F (36.5 °C) (Oral)   Resp 18   Ht 177.8 cm (70\")   Wt 110 kg (243 lb)   SpO2 (!) 89%   BMI 34.87 kg/m²     General Appearance: Minimal distress  Abdomen: incision is clean and dry, CORNELIA benign    CBC  Results from last 7 days   Lab Units 08/30/23  0406   WBC 10*3/mm3 6.94   HEMOGLOBIN g/dL 12.7*   HEMATOCRIT % 38.8   PLATELETS 10*3/mm3 251       CMP/BMP  Results from last 7 days   Lab Units 08/30/23  0406 08/29/23  0213   SODIUM mmol/L 141 141   POTASSIUM mmol/L 4.0 3.9   CHLORIDE mmol/L 106 105   CO2 mmol/L 27.0 26.0   BUN mg/dL 16 16   CREATININE mg/dL 1.24 1.32*   CALCIUM mg/dL 8.7 8.5*   BILIRUBIN mg/dL  --  0.6   ALK PHOS U/L  --  72   ALT (SGPT) U/L  --  48*   AST (SGOT) U/L  --  38   GLUCOSE mg/dL 98 133*         ASSESSMENT/PLAN:  Status post laparoscopic cholecystectomy for acute on chronic calculus cholecystitis    Regular low-fat diet as tolerated  DC CORNELIA drain  May transition to oral anticoagulation from a surgical perspective    May discharge home from a general surgical perspective.    You may not drive within 24 hour of receiving narcotic pain medication.  You may return to work/school in 2 weeks. Follow restrictions.  Restrictions of no excessive twisting/bending/lifting greater than 15 lbs for 6 weeks.  You shower after 24 hours from the surgical procedure.   No tub baths, do not submerge the incision underwater in a tub bath etc.    Remove the Steri-Strips after 5 days.    Follow-up at [facility] in 2 weeks.      Lai Cuadra MD  8/30/2023  09:26 EDT   "

## 2023-08-30 NOTE — PLAN OF CARE
Problem: Adult Inpatient Plan of Care  Goal: Plan of Care Review  Outcome: Met  Flowsheets (Taken 8/30/2023 1057)  Progress: improving  Plan of Care Reviewed With: patient  Outcome Evaluation: VSS, pt transitioned from heparin gtt to po Eliquis. CORNELIA drain to be removed prior to discharge. BM x2 today. Tolerating diet. Reports pain and nausea well controlled at this time. Abd binder in place. Daughter at bedside.  Goal: Patient-Specific Goal (Individualized)  Outcome: Met  Goal: Absence of Hospital-Acquired Illness or Injury  Outcome: Met  Intervention: Identify and Manage Fall Risk  Recent Flowsheet Documentation  Taken 8/30/2023 1000 by Mariam Olivares RN  Safety Promotion/Fall Prevention:   activity supervised   fall prevention program maintained   nonskid shoes/slippers when out of bed   safety round/check completed  Taken 8/30/2023 0800 by Mariam Olivares RN  Safety Promotion/Fall Prevention:   activity supervised   fall prevention program maintained   nonskid shoes/slippers when out of bed   safety round/check completed  Intervention: Prevent Skin Injury  Recent Flowsheet Documentation  Taken 8/30/2023 1000 by Mariam Olivares RN  Body Position: position changed independently  Taken 8/30/2023 0800 by Mariam Olivares RN  Body Position: position changed independently  Intervention: Prevent and Manage VTE (Venous Thromboembolism) Risk  Recent Flowsheet Documentation  Taken 8/30/2023 1000 by Mariam Olivares RN  Activity Management: activity encouraged  Taken 8/30/2023 0800 by Mariam Olivares RN  Activity Management: activity encouraged  Goal: Optimal Comfort and Wellbeing  Outcome: Met  Intervention: Provide Person-Centered Care  Recent Flowsheet Documentation  Taken 8/30/2023 0800 by Mariam Olivares RN  Trust Relationship/Rapport:   care explained   thoughts/feelings acknowledged  Goal: Readiness for Transition of Care  Outcome: Met     Problem: Hypertension Comorbidity  Goal: Blood Pressure in Desired Range  Outcome:  Met  Intervention: Maintain Blood Pressure Management  Recent Flowsheet Documentation  Taken 8/30/2023 0800 by Mariam Olivares RN  Medication Review/Management: medications reviewed     Problem: Pain Acute  Goal: Acceptable Pain Control and Functional Ability  Outcome: Met  Intervention: Prevent or Manage Pain  Recent Flowsheet Documentation  Taken 8/30/2023 0800 by Mariam Olivares RN  Medication Review/Management: medications reviewed  Intervention: Optimize Psychosocial Wellbeing  Recent Flowsheet Documentation  Taken 8/30/2023 0800 by Mariam Olivares RN  Supportive Measures:   active listening utilized   verbalization of feelings encouraged  Diversional Activities: television   Goal Outcome Evaluation:  Plan of Care Reviewed With: patient        Progress: improving  Outcome Evaluation: VSS, pt transitioned from heparin gtt to po Eliquis. CORNELIA drain to be removed prior to discharge. BM x2 today. Tolerating diet. Reports pain and nausea well controlled at this time. Abd binder in place. Daughter at bedside.

## 2023-08-30 NOTE — PLAN OF CARE
Goal Outcome Evaluation:  Plan of Care Reviewed With: patient        Progress: improving  Outcome Evaluation: VSS on RA. NSR. A&O x4. PRNs given for abdominal pain. Stool softeners given, no BM yet. CORNELIA to bulb suction with minimal output. Heparin infusing at 13.5 units/kg/hour. Pt sleeping comfortably. Will continue plan of care.

## 2023-08-31 ENCOUNTER — TRANSITIONAL CARE MANAGEMENT TELEPHONE ENCOUNTER (OUTPATIENT)
Dept: CALL CENTER | Facility: HOSPITAL | Age: 63
End: 2023-08-31
Payer: COMMERCIAL

## 2023-08-31 NOTE — OUTREACH NOTE
Call Center TCM Note      Flowsheet Row Responses   [facility] patient discharged from? [place]   Does the patient have one of the following disease processes/diagnoses(primary or secondary)? General Surgery   TCM attempt successful? Yes   Call start time 0943   Call end time 0948   Discharge diagnosis Pulmonary emboli,Acute on chronic cholecystitis (s/p lap ccy this admission)   Person spoke with today (if not patient) and relationship patient   Meds reviewed with patient/caregiver? Yes   Does the patient have all medications related to this admission filled (includes all antibiotics, pain medications, etc.) Yes   Prescription comments No concerns or questions   Is the patient taking all medications as directed (includes completed medication regime)? Yes   Medication comments Patient asked if naproxen and eliquis are okay to take- advised to speak with PCP since Naproxen was stopped.   Comments New Patient appt on 09/13 @ 0930   Does the patient have an appointment with their PCP within 7-14 days of discharge? Yes   Has home health visited the patient within 72 hours of discharge? N/A   Psychosocial issues? No   Did the patient receive a copy of their discharge instructions? Yes   Nursing interventions Reviewed instructions with patient   What is the patient's perception of their health status since discharge? Improving   Nursing interventions Nurse provided patient education   Is the patient /caregiver able to teach back basic post-op care? Keep incision areas clean,dry and protected, Do not remove steri-strips   Is the patient/caregiver able to teach back signs and symptoms of incisional infection? Increased redness, swelling or pain at the incisonal site, Increased drainage or bleeding, Incisional warmth, Pus or odor from incision, Fever   Is the patient/caregiver able to teach back steps to recovery at home? Set small, achievable goals for return to baseline health, Rest and rebuild strength, gradually  increase activity   Is the patient/caregiver able to teach back the hierarchy of who to call/visit for symptoms/problems? PCP, Specialist, Home health nurse, Urgent Care, ED, 911 Yes   TCM call completed? Yes   Wrap up additional comments Patient reports doing very well today. Pain is controlled. no s/s of infection noted. He is washing in antibacterial soap. He has no concerns or questions noted.   Call end time 0948   Would this patient benefit from a Referral to [facility] Social Work? No   Is the patient interested in additional calls from an ambulatory ? No            Era Marroquin RN    8/31/2023, 09:49 EDT

## 2023-08-31 NOTE — PAYOR COMM NOTE
"Utilization Review: [phone]  FAx: [phone]  Juan Cordon (63 y.o. Male)       Date of Birth   1960    Social Security Number       Address   [address] [place] [id number]    Home Phone       MRN   [id number]       Christianity   None    Marital Status                               Admission Date   8/26/23    Admission Type   Emergency    Admitting Provider   Billie Lemons II, DO    Attending Provider       Department, Room/Bed   [address], S207/1       Discharge Date   8/30/2023    Discharge Disposition   Home or Self Care    Discharge Destination                                 Attending Provider: (none)   Allergies: Sulfa Antibiotics    Isolation: None   Infection: None   Code Status: Not on file    Ht: 177.8 cm (70\")   Wt: 110 kg (243 lb)    Admission Cmt: None   Principal Problem: None                  Active Insurance as of 8/26/2023       Primary Coverage       Payor Plan Insurance Group Employer/Plan Group    MISC COMMERCIAL MISC COMMERCIAL [id number]       Coverage Address Coverage Phone Number Coverage Fax Number Effective Dates     [address] [phone]  1/1/2019 - None Entered    [facility] [id number]         Subscriber Name Subscriber Birth Date Member ID       JUAN CORDON 1960 [id number]                     Emergency Contacts        (Rel.) Home Phone Work Phone Mobile Phone    Stefani Cordon (Spouse) -- -- [phone]    TAMMY CORDON (Daughter) [phone] -- [phone]                 Physician Progress Notes (last 48 hours)        Lai Cuadra MD at 08/30/23 0925          General Surgery Post op Follow Up Note    Subjective:   Tolerating p.o. with GI function    /91 Comment: AM meds given  Pulse 83   Temp 97.7 °F (36.5 °C) (Oral)   Resp 18   Ht 177.8 cm (70\")   Wt 110 kg (243 lb)   SpO2 (!) 89%   BMI 34.87 kg/m²     General Appearance: Minimal distress  Abdomen: incision is clean and dry, CORNELIA " benign    CBC  Results from last 7 days   Lab Units 23  0406   WBC 10*3/mm3 6.94   HEMOGLOBIN g/dL 12.7*   HEMATOCRIT % 38.8   PLATELETS 10*3/mm3 251       CMP/BMP  Results from last 7 days   Lab Units 23  0406 23  0213   SODIUM mmol/L 141 141   POTASSIUM mmol/L 4.0 3.9   CHLORIDE mmol/L 106 105   CO2 mmol/L 27.0 26.0   BUN mg/dL 16 16   CREATININE mg/dL 1.24 1.32*   CALCIUM mg/dL 8.7 8.5*   BILIRUBIN mg/dL  --  0.6   ALK PHOS U/L  --  72   ALT (SGPT) U/L  --  48*   AST (SGOT) U/L  --  38   GLUCOSE mg/dL 98 133*         ASSESSMENT/PLAN:  Status post laparoscopic cholecystectomy for acute on chronic calculus cholecystitis    Regular low-fat diet as tolerated  DC CORNELIA drain  May transition to oral anticoagulation from a surgical perspective    May discharge home from a general surgical perspective.    You may not drive within 24 hour of receiving narcotic pain medication.  You may return to work/school in 2 weeks. Follow restrictions.  Restrictions of no excessive twisting/bending/lifting greater than 15 lbs for 6 weeks.  You shower after 24 hours from the surgical procedure.   No tub baths, do not submerge the incision underwater in a tub bath etc.    Remove the Steri-Strips after 5 days.    Follow-up at [place] [facility] in 2 weeks.      Lai Cuadra MD  2023  09:26 EDT     Electronically signed by Lai Cuadra MD at 23 0927       Kenny Kolb MD at 23 1534              [facility] Medicine Services  PROGRESS NOTE    Patient Name: Juan Suero  : 1960  MRN: [id number]    Date of Admission: 2023  Primary Care Physician: Provider, No Known    Subjective   Subjective     CC:  Gallstones, a/c cholecystitis, PE    HPI:  Ambulating in hallway. No dyspnea. No n/v/d    ROS:  No f/c  No dysuria    Objective   Objective     Vital Signs:   Temp:  [97.8 °F (36.6 °C)-99.2 °F (37.3 °C)] 98.3 °F (36.8 °C)  Heart Rate:   [] 120  Resp:  [18] 18  BP: (148-166)/(81-97) 156/95  Flow (L/min):  [2] 2     Physical Exam:  Constitutional:Alert, oriented x 3, nontoxic appearing.   Psych:Normal/appropriate affect  HEENT:NCAT, oropharynx clear  Neck: neck supple, full range of motion  Neuro: Face symmetric, speech clear, equal , moves all extremities  Cardiac: rrr; No pretibial pitting edema  Resp: ctab, normal effort  GI: abd soft, ruq tenderness,appropriate post-op tenderness  Skin: No extremity rash  Musculoskeletal/extremities: no cyanosis of extremities; no significant ankle edema      Results Reviewed:  LAB RESULTS:      Lab 08/29/23  1430 08/29/23  0754 08/29/23  0213 08/28/23  1806 08/28/23  0950 08/28/23  0356 08/27/23  0510 08/26/23  2112 08/26/23  1038   WBC  --   --  12.01*  --   --  13.18* 6.55  --  7.64   HEMOGLOBIN  --   --  13.2  --   --  13.4 13.6  --  14.6   HEMATOCRIT  --   --  40.9  --   --  41.3 41.9  --  44.8   PLATELETS  --   --  242  --   --  277 240  --  294   NEUTROS ABS  --   --  9.87*  --   --   --  4.53  --  6.10   IMMATURE GRANS (ABS)  --   --  0.05  --   --   --  0.02  --  0.03   LYMPHS ABS  --   --  0.81  --   --   --  1.16  --  0.75   MONOS ABS  --   --  1.19*  --   --   --  0.71  --  0.68   EOS ABS  --   --  0.06  --   --   --  0.10  --  0.05   MCV  --   --  89.1  --   --  89.0 88.0  --  88.4   LACTATE  --   --   --   --   --   --   --   --  1.3   PROTIME  --   --   --   --  15.1*  --   --   --   --    APTT  --   --   --   --  32.8*  --   --   --   --    HEPARIN ANTI-XA 0.47 0.68 0.60 0.27* 0.10*  --   --    < >  --     < > = values in this interval not displayed.         Lab 08/29/23  0213 08/28/23  0356 08/27/23  0510 08/26/23  1038   SODIUM 141 137 143 142   POTASSIUM 3.9 4.1 4.6 4.3   CHLORIDE 105 103 110* 109*   CO2 26.0 24.0 25.0 23.0   ANION GAP 10.0 10.0 8.0 10.0   BUN 16 14 14 17   CREATININE 1.32* 1.13 1.22 1.28*   EGFR 59.9* 72.1 65.8 62.1   GLUCOSE 133* 144* 91 112*   CALCIUM 8.5* 8.3*  8.5* 8.7   MAGNESIUM 2.1 2.0 2.1  --          Lab 08/29/23  0213 08/28/23  0356 08/27/23  0510 08/26/23  1038   TOTAL PROTEIN 6.5 6.2 5.8* 6.7   ALBUMIN 3.6 3.8 3.7 4.0   GLOBULIN 2.9 2.4 2.1 2.7   ALT (SGPT) 48* 44* 20 22   AST (SGOT) 38 42* 16 18   BILIRUBIN 0.6 0.6 0.3 0.4   ALK PHOS 72 76 80 86   LIPASE  --   --   --  51         Lab 08/28/23  0950 08/26/23  1239 08/26/23  1038   PROBNP  --   --  97.5   HSTROP T  --  10 10   PROTIME 15.1*  --   --    INR 1.18*  --   --                  Brief Urine Lab Results  (Last result in the past 365 days)        Color   Clarity   Blood   Leuk Est   Nitrite   Protein   CREAT   Urine HCG        08/26/23 1043 Yellow   Clear   Negative   Negative   Negative   Negative                   Microbiology Results Abnormal       Procedure Component Value - Date/Time    MRSA Screen, PCR (Inpatient) - Swab, Nares [[id number]]  (Normal) Collected: 08/28/23 1815    Lab Status: Final result Specimen: Swab from Nares Updated: 08/28/23 2047     MRSA PCR Negative    Narrative:      The negative predictive value of this diagnostic test is high and should only be used to consider de-escalating anti-MRSA therapy. A positive result may indicate colonization with MRSA and must be correlated clinically.  MRSA Negative            No radiology results from the last 24 hrs    Results for orders placed during the hospital encounter of 08/26/23    Adult Transthoracic Echo Complete W/ Cont if Necessary Per Protocol    Interpretation Summary    Left ventricular ejection fraction appears to be 56 - 60%.    The left ventricular cavity is dilated.    The right ventricular cavity is mildly dilated.    The left atrial cavity is mildly dilated.    The right atrial cavity is mildly  dilated.    Estimated right ventricular systolic pressure from tricuspid regurgitation is normal (<35 mmHg).      Current medications:  Scheduled Meds:amLODIPine, 5 mg, Oral, Daily  losartan, 100 mg, Oral, Q24H  pantoprazole, 40 mg,  Oral, Daily  senna-docusate sodium, 2 tablet, Oral, BID      Continuous Infusions:heparin, 12.5 Units/kg/hr, Last Rate: 12.5 Units/kg/hr (08/29/23 0934)  Pharmacy to Dose Heparin,   sodium chloride, 9 mL/hr, Last Rate: 9 mL/hr (08/27/23 1020)      PRN Meds:.  acetaminophen    senna-docusate sodium **AND** polyethylene glycol **AND** bisacodyl **AND** bisacodyl    hydrALAZINE    HYDROcodone-acetaminophen    Magnesium Standard Dose Replacement - Follow Nurse / BPA Driven Protocol    Morphine    ondansetron    Pharmacy to Dose Heparin    Sodium Chloride (PF)    sodium chloride    Assessment & Plan   Assessment & Plan     Active Hospital Problems    Diagnosis  POA    **Pulmonary embolism [I26.99]  Yes    Gallstones [K80.20]  Yes    Essential hypertension [I10]  Yes    Obesity (BMI 30-39.9) [E66.9]  Unknown    Hyperlipidemia [E78.5]  Yes      Resolved Hospital Problems   No resolved problems to display.        Brief Hospital Course to date:  Juan Suero is a 63 y.o. male w/ hx htn, hl who presented with 3 day sof ruq pain, somewhat worsened after eating and sometimes w/ pleurisy. Ct angio chest revealed TARIK and RLL segmental PE's (no strain). Incidentally noted gallstones. Lipase, lft's normal. Initiated on heparin drip. Dr. Cuadra of gen surgery consulted. Echo was normal. Venous duplex showed LLE calf dvt. Heparin drip was held, went for lap ccy on 8/27/23 showing acute on chronic cholecystitis.      Bilateral, segmental Pulmonary emboli (without heart strain, without hypoxia)  LLE DVT (posterior tibial vein)  -likely due to immobility due to frequent traveling via planes and car to [place]  -CT angio chest: several pulmonary emboli within left upper and right lower segmental branches (lungs clear; incidental gallstones)  -echo normal  -BLE venous duplex: LLE posterior tibial vein DVT  -initiated on heparin drip evening 8/26/23; heparin drip stopped for ccy 8/27/23, then placed on prophylactic dose sq heparin  immediately post-op per surgery recs  -ok'd for heparin drip on 8/28 after lap ccy  -8/29/23: continue heparin drip for now, plan to change to eliquis/NOAC tomorrow prior to discharge once cleared for discharge by Dr. Cuadra      Acute on chronic cholecystitis (s/p lap ccy this admission)  -s/p lap ccy 8/27/23; discussed w/ Dr. Cuadra post-operatively, keeping off of heparin drip tonight but placing on dvt prophylaxis dose heparin. Patient may require GI consultation tomorrow, he will assess in the morning  -Dr. Cuadra following: stopped zosyn 8/29; advancing diet; danielle drain being monitored (currently no bilous output). From surgical standpoint if tolerating diet, if no bile in danielle drain tomorrow then he will d/c danielle drain and would be cleared for discharge.     HTN  Bradycardia  -holding atenolol  -continue amlodipine and losartan    CKD (baseline cr ~1.3)  -creatinine stable    Am labs: cbc,bmp    Expected Discharge Location and Transportation: home  Expected Discharge 8/30/23 depending on clinical course      DVT prophylaxis:  Medical DVT prophylaxis orders are present.     AM-PAC 6 Clicks Score (PT): 24 (08/29/23 2542)    CODE STATUS:   There are no questions and answers to display.       Kenny Kolb MD  08/29/23        Electronically signed by Kenny Kolb MD at 08/29/23 4300

## 2023-09-14 ENCOUNTER — OFFICE VISIT (OUTPATIENT)
Dept: FAMILY MEDICINE CLINIC | Facility: CLINIC | Age: 63
End: 2023-09-14
Payer: COMMERCIAL

## 2023-09-14 ENCOUNTER — PATIENT ROUNDING (BHMG ONLY) (OUTPATIENT)
Dept: FAMILY MEDICINE CLINIC | Facility: CLINIC | Age: 63
End: 2023-09-14
Payer: COMMERCIAL

## 2023-09-14 ENCOUNTER — PATIENT MESSAGE (OUTPATIENT)
Dept: FAMILY MEDICINE CLINIC | Facility: CLINIC | Age: 63
End: 2023-09-14

## 2023-09-14 VITALS
BODY MASS INDEX: 33.64 KG/M2 | OXYGEN SATURATION: 97 % | HEIGHT: 70 IN | DIASTOLIC BLOOD PRESSURE: 90 MMHG | WEIGHT: 235 LBS | SYSTOLIC BLOOD PRESSURE: 162 MMHG | HEART RATE: 93 BPM

## 2023-09-14 DIAGNOSIS — Z90.49 S/P CHOLECYSTECTOMY: ICD-10-CM

## 2023-09-14 DIAGNOSIS — K21.9 GASTROESOPHAGEAL REFLUX DISEASE, UNSPECIFIED WHETHER ESOPHAGITIS PRESENT: ICD-10-CM

## 2023-09-14 DIAGNOSIS — I26.94 MULTIPLE SUBSEGMENTAL PULMONARY EMBOLI WITHOUT ACUTE COR PULMONALE: Primary | ICD-10-CM

## 2023-09-14 DIAGNOSIS — Z76.89 ENCOUNTER TO ESTABLISH CARE: Primary | ICD-10-CM

## 2023-09-14 DIAGNOSIS — I26.94 MULTIPLE SUBSEGMENTAL PULMONARY EMBOLI WITHOUT ACUTE COR PULMONALE: ICD-10-CM

## 2023-09-14 DIAGNOSIS — I10 ESSENTIAL HYPERTENSION: ICD-10-CM

## 2023-09-14 RX ORDER — OLMESARTAN MEDOXOMIL, AMLODIPINE AND HYDROCHLOROTHIAZIDE TABLET 20/5/12.5 MG 20; 5; 12.5 MG/1; MG/1; MG/1
1 TABLET ORAL DAILY
Qty: 30 TABLET | Refills: 2 | Status: SHIPPED | OUTPATIENT
Start: 2023-09-14 | End: 2023-09-15 | Stop reason: SDUPTHER

## 2023-09-14 NOTE — PROGRESS NOTES
Transitional Care Follow Up Visit  Subjective     Juan Suero is a 63 y.o. male who presents for a transitional care management visit.    Within 48 business hours after discharge our office contacted him via telephone to coordinate his care and needs.      I reviewed and discussed the details of that call along with the discharge summary, hospital problems, inpatient lab results, inpatient diagnostic studies, and consultation reports with Juan.     Current outpatient and discharge medications have been reconciled for the patient.  Reviewed by: Mariya Glasgow DO          8/30/2023     3:34 PM   Date of TCM Phone Call   [facility]   Date of Admission 8/26/2023   Date of Discharge 8/30/2023   Discharge Disposition Home or Self Care     Risk for Readmission (LACE) Score: 7 (8/30/2023  6:01 AM)      History of Present Illness   Course During Hospital Stay: Patient was admitted to [facility] on 8/26/2023.  He presented to ER with 3-day history of right upper quadrant pain.  Work-up in ED significant for CT angio confirmed TARIK and RLL segmental PEs without evidence of heart strain.  Patient was started on heparin drip and eventually transitioned to Eliquis.  Etiology presumed to be prolonged immobility as he is off traveling back and forth from [place].  Hospital course is complicated by acute on chronic cholecystitis.  Patient was taken for laparoscopic cholecystectomy on 8/27.    Today patient reports he is feeling well.  He has finished his loading dose of Eliquis and is now on maintenance dose.  He denies any signs or symptoms of bleeding.  Denies any chest pain, shortness of breath.    Followed up with his surgeon this morning.  He was cleared for travel.  Denies any significant abdominal pain.  He has been wearing abdominal binder.     The following portions of the patient's history were reviewed and updated as appropriate: allergies, current medications, past family history, past medical  history, past social history, past surgical history, and problem list.    Review of Systems  Negative/not pertinent unless otherwise noted in HPI    Objective   Physical Exam  Vitals reviewed.   Constitutional:       General: He is not in acute distress.     Appearance: Normal appearance. He is normal weight.   HENT:      Head: Normocephalic and atraumatic.      Right Ear: Tympanic membrane normal.      Left Ear: Tympanic membrane normal.      Nose: Nose normal.      Mouth/Throat:      Mouth: Mucous membranes are moist.      Pharynx: Oropharynx is clear. No oropharyngeal exudate or posterior oropharyngeal erythema.   Eyes:      Extraocular Movements: Extraocular movements intact.      Conjunctiva/sclera: Conjunctivae normal.      Pupils: Pupils are equal, round, and reactive to light.   Cardiovascular:      Rate and Rhythm: Normal rate and regular rhythm.      Heart sounds: Normal heart sounds. No murmur heard.  Pulmonary:      Effort: Pulmonary effort is normal.      Breath sounds: No wheezing.   Abdominal:      General: Abdomen is flat. Bowel sounds are normal.      Palpations: Abdomen is soft. There is no mass.      Tenderness: There is no abdominal tenderness.      Comments: Abdominal binder in place   Musculoskeletal:         General: Normal range of motion.      Cervical back: Normal range of motion and neck supple.   Lymphadenopathy:      Cervical: No cervical adenopathy.   Neurological:      General: No focal deficit present.      Mental Status: He is alert.   Psychiatric:         Mood and Affect: Mood normal.         Thought Content: Thought content normal.       Assessment & Plan      Diagnosis Plan   1. Encounter to establish care        2. Essential hypertension  Olmesartan-amLODIPine-HCTZ 20-5-12.5 MG tablet      3. Multiple subsegmental pulmonary emboli without acute cor pulmonale        4. Gastroesophageal reflux disease, unspecified whether esophagitis present        5. S/P cholecystectomy           Recent cholecystitis s/p cholecystectomy  -Recovering well postoperatively  -Cleared by surgeon for travel today    Provoked PE  -Had long discussion with patient and daughter regarding duration of anticoagulation today.  Given the continued presence of risk factor (frequent traveling and immobility) will likely plan for extended duration of AC past recommended minimum of 3 months.  We will readdress at 3-month follow-up with likely repeat ultrasound of the left lower extremity.

## 2023-09-14 NOTE — ASSESSMENT & PLAN NOTE
Controlled on chronic PPI  UTD on endoscopy within the last 3 years  Reviewed long-term side effects of chronic PPI use  Serial B12, magnesium checks

## 2023-09-14 NOTE — ASSESSMENT & PLAN NOTE
Dx 8/2023, presumed provoked 2/2 prolonged immobility  Continue Eliquis BID, anticipate long-term duration for at least 6 to 12 months given continued presence of risk factor with frequent traveling back and forth from [place] to [place]

## 2023-09-14 NOTE — ASSESSMENT & PLAN NOTE
Uncontrolled  Given suboptimal response, will STOP losartan 100mg  No indication noted for beta-blocker therapy and particularly in the setting of his chronic bradycardia agree to STOP atenolol  Start olmesartan-amlodipine-HCTZ 20-5-12.5 mg  Increase water intake, physical activity  Encouraged DASH diet  Patient to follow-up in 2 weeks for BP check prior to traveling back to [place]

## 2023-09-14 NOTE — PROGRESS NOTES
New Patient Office Visit      Date: 2023   Patient Name: Juan Suero  : 1960   MRN: [id number]     Chief Complaint:    Chief Complaint   Patient presents with    Abdominal Pain     Patient feeling well. Patient wants to talk about hypertension        History of Present Illness: Juan Suero is a 63 y.o. male who is here today to establish care.      Subjective      HPI:  Patient presents today with daughter (Joyce) to establish care.  His current residence is in [place] but he and his wife are planning on moving to [place] soon.  He has been traveling back and forth between here in [place] over the last several months while they are building their new home.  Wife is currently in [place] receiving cancer treatments at [facility].    Please see TCM note above regarding his recent hospitalization for cholecystitis and pulmonary emboli.  He is currently tolerating Eliquis therapy well.  Denies any signs or symptoms of bleeding.    Has struggled with high blood pressure for several years.  States that despite multiple tried and failed medications his former PCP can never get it quite under control.  Prior to his hospitalization his regimen was losartan 100 mg, amlodipine 5 mg and atenolol 50 mg daily.  His numbers at home still ran 150-160s/80s-90s.  He and daughter note that his heart rate has always been very low.  Patient recalls that even when he was going through his physicals in his 20s for work his baseline heart rate was around 40.  He believes this was occurring even prior to starting beta-blocker therapy for his blood pressure.  He denies any other known cardiac conditions.  Denies any history of heart attack or known family history either.  He does feel fatigued often throughout the day.    Has longstanding history of GERD issues as well.  Had EGD and colonoscopy within the last 2 to 3 years.  He is on chronic PPI therapy with pantoprazole 40 mg daily.  This works well to control symptoms.  Follows  with a gastroenterologist in [place] and was told that he would need repeat endoscopies at least every 3 years.  Had benign polyps removed on his last scope.    Lastly patient states that his daughter and wife voiced concerns that he may have sleep apnea.  He does snore at night.  Often feels fatigued through the day.  He has never been evaluated with a sleep study.  He states that when he sleeps he feels well rested.  He typically will sleep 3 to 4 hours before waking up.  He is usually able to fall back asleep.  They have not witnessed any true apneic episodes.    Review of Systems:   Negative/not pertinent unless otherwise noted above in HPI.     Past Medical History:   Past Medical History:   Diagnosis Date    Hyperlipidemia     Hypertension        Past Surgical History:   Past Surgical History:   Procedure Laterality Date    CHOLECYSTECTOMY N/A 8/27/2023    Procedure: CHOLECYSTECTOMY LAPAROSCOPIC;  Surgeon: Lai Cuadra MD;  Location: [facility];  Service: General;  Laterality: N/A;    HERNIA REPAIR      WISDOM TOOTH EXTRACTION     Vasectomy  Father and Paternal uncle w Prostate Cancer  Brother w NE cancer    Family History: History reviewed. No pertinent family history.   Mother- Breast Cancer    Social History:   Social History     Socioeconomic History    Marital status:    Tobacco Use    Smoking status: Never    Smokeless tobacco: Never   Vaping Use    Vaping Use: Never used   Substance and Sexual Activity    Alcohol use: Yes    Drug use: Defer    Sexual activity: Defer   1x per month    Medications:     Current Outpatient Medications:     apixaban (ELIQUIS) 5 MG tablet tablet, Take 2 tablets by mouth Every 12 (Twelve) Hours for 7 days, THEN 1 tablet Every 12 (Twelve) Hours., Disp: 148 tablet, Rfl: 0    cholecalciferol (VITAMIN D3) 25 MCG (1000 UT) tablet, Take 1 tablet by mouth Daily., Disp: , Rfl:     multivitamin with minerals tablet tablet, Take 1 tablet by mouth Daily., Disp: , Rfl:      "pantoprazole (PROTONIX) 40 MG EC tablet, Take 1 tablet by mouth Daily., Disp: , Rfl:     sennosides-docusate (PERICOLACE) 8.6-50 MG per tablet, Take 2 tablets by mouth 2 (Two) Times a Day., Disp: 20 tablet, Rfl: 0    Olmesartan-amLODIPine-HCTZ 20-5-12.5 MG tablet, Take 1 tablet by mouth Daily., Disp: 30 tablet, Rfl: 2    Allergies:   Allergies   Allergen Reactions    Sulfa Antibiotics Hives       Immunizations:  Immunization History   Administered Date(s) Administered    COVID-19 (MODERNA) 1st,2nd,3rd Dose Monovalent 03/09/2022    COVID-19 (PFIZER) BIVALENT 12+YRS 10/11/2022    COVID-19 (PFIZER) Purple Cap Monovalent 01/17/2021, 02/08/2021, 07/31/2021       Colorectal Screening:   Up-To-Date   Last Completed Colonoscopy       This patient has no relevant Health Maintenance data.            Tobacco Use: Low Risk     Smoking Tobacco Use: Never    Smokeless Tobacco Use: Never    Passive Exposure: Not on file       Social History     Substance and Sexual Activity   Alcohol Use Yes        Social History     Substance and Sexual Activity   Drug Use Defer      PHQ-2 Depression Screening  Little interest or pleasure in doing things? 0-->not at all   Feeling down, depressed, or hopeless? 0-->not at all   PHQ-2 Total Score 0     PHQ-9 Total Score: 0       Objective     Physical Exam:  Vital Signs:   Vitals:    09/14/23 1426   BP: 162/90   BP Location: Left arm   Patient Position: Sitting   Cuff Size: Adult   Pulse: 93   SpO2: 97%   Weight: 107 kg (235 lb)   Height: 177.8 cm (70\")     Ambulatory blood pressure readings are reviewed.  Baseline pressures at home are consistent with reading in office today.  Log will be scanned into chart.    Body mass index is 33.72 kg/m².    Physical Exam  Vitals reviewed.   Constitutional:       General: He is not in acute distress.     Appearance: Normal appearance. He is normal weight.   HENT:      Head: Normocephalic and atraumatic.      Right Ear: Tympanic membrane normal.      Left Ear: " Tympanic membrane normal.      Nose: Nose normal.      Mouth/Throat:      Mouth: Mucous membranes are moist.      Pharynx: Oropharynx is clear. No oropharyngeal exudate or posterior oropharyngeal erythema.   Eyes:      Extraocular Movements: Extraocular movements intact.      Conjunctiva/sclera: Conjunctivae normal.      Pupils: Pupils are equal, round, and reactive to light.   Cardiovascular:      Rate and Rhythm: Normal rate and regular rhythm.      Heart sounds: Normal heart sounds. No murmur heard.  Pulmonary:      Effort: Pulmonary effort is normal.      Breath sounds: No wheezing.   Abdominal:      General: Abdomen is flat. Bowel sounds are normal.      Palpations: Abdomen is soft. There is no mass.      Tenderness: There is no abdominal tenderness.      Comments: Abdominal binder present.   Musculoskeletal:         General: Normal range of motion.      Cervical back: Normal range of motion and neck supple.   Lymphadenopathy:      Cervical: No cervical adenopathy.   Neurological:      General: No focal deficit present.      Mental Status: He is alert.   Psychiatric:         Mood and Affect: Mood normal.         Thought Content: Thought content normal.       Assessment / Plan      Assessment/Plan:   Diagnoses and all orders for this visit:    1. Encounter to establish care (Primary)    2. Essential hypertension  Assessment & Plan:  Uncontrolled  Given suboptimal response, will STOP losartan 100mg  No indication noted for beta-blocker therapy and particularly in the setting of his chronic bradycardia agree to STOP atenolol  Start olmesartan-amlodipine-HCTZ 20-5-12.5 mg  Increase water intake, physical activity  Encouraged DASH diet  Patient to follow-up in 2 weeks for BP check prior to traveling back to [place]    Orders:  -     Olmesartan-amLODIPine-HCTZ 20-5-12.5 MG tablet; Take 1 tablet by mouth Daily.  Dispense: 30 tablet; Refill: 2    3. Multiple subsegmental pulmonary emboli without acute cor  pulmonale  Assessment & Plan:  Dx 8/2023, presumed provoked 2/2 prolonged immobility  Continue Eliquis BID, anticipate long-term duration for at least 6 to 12 months given continued presence of risk factor with frequent traveling back and forth from [place] to [place]      4. Gastroesophageal reflux disease, unspecified whether esophagitis present  Assessment & Plan:  Controlled on chronic PPI  UTD on endoscopy within the last 3 years  Reviewed long-term side effects of chronic PPI use  Serial B12, magnesium checks        -Patient declines referral for sleep study today.  We did discuss this may be needed in the future if his hypertension remains uncontrolled.   -Declines preventative visit.  Plans to establish with PCP in [place] and will discuss health maintenance with he/she.  -Need records from previous PCP  -Patient plans to get updated blood work with his new PCP in [place], will need records for this as well.  Consider BMP check at follow-up given initiation of HCTZ.    Juan Pio voices understanding and acceptance of this advice and will call back with any further questions or concerns. AVS with preventive healthcare tips printed for patient.         Follow Up:   Return in about 2 weeks (around 9/28/2023) for Recheck HTN.        Mariya Glasgow DO  [facility] [address]

## 2023-09-25 NOTE — TELEPHONE ENCOUNTER
From: Latosha Napier  To: Juan Suero  Sent: 9/14/2023 4:34 PM EDT  Subject: Welcome to the Practice!    Greetings!    This is Latosha at [facility] on [address], the office of Dr. Mariya Glasgow.    I am reaching out to welcome you to our practice! I feel that patient feedback is crucial to ensuring that we provide the highest quality of care. I would like to take this opportunity to ask a few questions about your recent visit. This is not a requirement. However, participation is greatly appreciated!    Thinking about your recent visit with us, what things went well?    We strive to ensure that we protect your safety and privacy. Is there anything we could have done to improve this during your visit?    We are always looking for ways to make each patient's experience better. Do you have any suggestions on how we may improve?    Overall, were you satisfied with your first visit to our practice?    Do you have any questions regarding your visit?    Thank you for taking the time to answer these questions today. You may also receive a [facility] survey via standard mail or email regarding your recent [facility] experience. The [facility] survey lets us know how we are doing in comparison to other offices throughout the country. That feedback is collected to guide [facility] as a whole in updating policies, focus on areas of improvement and so much more! Once again, completing the [employer] survey is not a requirement. However, we at [facility] value your thoughts, feelings and opinions. Please feel free to contact our office at [phone] with any questions or concerns.       Thank you and have a wonderful day!

## 2023-09-27 NOTE — PROGRESS NOTES
Chief Complaint   Patient presents with    Blood Pressure Check       HPI:  Juan Suero is a 63 y.o. male who presents today for follow-up on blood pressure.    Patient was seen to establish care on 9/14.  At that visit blood pressure was suboptimally controlled at 160/90 with similar readings at home.  Has history of difficulty with controlling his blood pressure.  Medications were adjusted and he was started on triple combo therapy with olmesartan-amlodipine-HCTZ 20-5-12.5 mg daily.    Patient states that he has done well since starting the medication.  Has felt slightly more fatigued but this is tolerable.  Denies any dizziness or lightheadedness.  Has not noticed any significant change in urinary frequency.  Blood pressure log of home readings is reviewed.  These have significantly improved from his last visit and are averaging 130s/80s.  He does still have occasional readings that are >140/90 but states that he has only been taking the medication for the last 10 days as the pharmacy was out of it.  Plans to establish with a new PCP in October at his home in [place].      PE:  Vitals:    09/28/23 0752   BP: 130/82   Pulse: 63   SpO2: 97%      Body mass index is 33.09 kg/m².    Gen Appearance: NAD  HEENT: Normocephalic, PERRL, no thyromegaly, trachea midline  Heart: RRR, normal S1 and S2, no murmur  Lungs: Normal respiratory effort  MSK: Moves all extremities well, normal gait, no peripheral edema  Neuro: No focal deficits    Current Outpatient Medications   Medication Sig Dispense Refill    apixaban (ELIQUIS) 5 MG tablet tablet Take 1 tablet by mouth Every 12 (Twelve) Hours. Indications: DVT/PE (active thrombosis) 60 tablet 11    cholecalciferol (VITAMIN D3) 25 MCG (1000 UT) tablet Take 1 tablet by mouth Daily.      multivitamin with minerals tablet tablet Take 1 tablet by mouth Daily.      Olmesartan-amLODIPine-HCTZ 20-5-12.5 MG tablet Take 1 tablet by mouth Daily. 30 tablet 2    pantoprazole (PROTONIX) 40 MG EC  tablet Take 1 tablet by mouth Daily.       No current facility-administered medications for this visit.        A/P:  Diagnoses and all orders for this visit:    1. Essential hypertension (Primary)       -BPs now mostly controlled.  He does still have some occasional elevated readings but would prefer to remain on his current dose as he has only been taking the medication for 10 days.  We reviewed other lifestyle modifications again that can help lower blood pressure including DASH diet, increase water intake and increased exercise.  Patient will continue to monitor his blood pressure regularly at home and get follow-up as scheduled with his new PCP in mid October.  If he gets consistent elevated readings prior to that he may call the office and we can adjust the dose as needed.    Return if symptoms worsen or fail to improve.     Dictated Utilizing Dragon Dictation    Please note that portions of this note were completed with a voice recognition program.    Part of this note may be an electronic transcription/translation of spoken language to printed text using the Dragon Dictation System.

## 2023-09-28 ENCOUNTER — OFFICE VISIT (OUTPATIENT)
Dept: FAMILY MEDICINE CLINIC | Facility: CLINIC | Age: 63
End: 2023-09-28
Payer: COMMERCIAL

## 2023-09-28 VITALS
HEIGHT: 70 IN | WEIGHT: 230.6 LBS | SYSTOLIC BLOOD PRESSURE: 130 MMHG | BODY MASS INDEX: 33.01 KG/M2 | OXYGEN SATURATION: 97 % | HEART RATE: 63 BPM | DIASTOLIC BLOOD PRESSURE: 82 MMHG

## 2023-09-28 DIAGNOSIS — I10 ESSENTIAL HYPERTENSION: Primary | ICD-10-CM

## 2024-02-13 ENCOUNTER — OFFICE VISIT (OUTPATIENT)
Dept: FAMILY MEDICINE CLINIC | Facility: CLINIC | Age: 64
End: 2024-02-13
Payer: COMMERCIAL

## 2024-02-13 VITALS
SYSTOLIC BLOOD PRESSURE: 122 MMHG | HEIGHT: 70 IN | DIASTOLIC BLOOD PRESSURE: 68 MMHG | BODY MASS INDEX: 32.78 KG/M2 | WEIGHT: 229 LBS | OXYGEN SATURATION: 96 % | HEART RATE: 69 BPM

## 2024-02-13 DIAGNOSIS — E78.2 MIXED HYPERLIPIDEMIA: ICD-10-CM

## 2024-02-13 DIAGNOSIS — I26.94 MULTIPLE SUBSEGMENTAL PULMONARY EMBOLI WITHOUT ACUTE COR PULMONALE: ICD-10-CM

## 2024-02-13 DIAGNOSIS — I10 ESSENTIAL HYPERTENSION: Primary | ICD-10-CM

## 2024-02-13 PROCEDURE — 99214 OFFICE O/P EST MOD 30 MIN: CPT | Performed by: STUDENT IN AN ORGANIZED HEALTH CARE EDUCATION/TRAINING PROGRAM

## 2024-02-13 RX ORDER — ATORVASTATIN CALCIUM 10 MG/1
1 TABLET, FILM COATED ORAL
COMMUNITY
Start: 2023-12-09

## 2024-03-25 ENCOUNTER — OFFICE VISIT (OUTPATIENT)
Dept: FAMILY MEDICINE CLINIC | Facility: CLINIC | Age: 64
End: 2024-03-25
Payer: COMMERCIAL

## 2024-03-25 VITALS
WEIGHT: 225.8 LBS | DIASTOLIC BLOOD PRESSURE: 80 MMHG | SYSTOLIC BLOOD PRESSURE: 132 MMHG | HEART RATE: 60 BPM | BODY MASS INDEX: 32.33 KG/M2 | HEIGHT: 70 IN | OXYGEN SATURATION: 96 %

## 2024-03-25 DIAGNOSIS — J30.89 SEASONAL ALLERGIC RHINITIS DUE TO OTHER ALLERGIC TRIGGER: Primary | ICD-10-CM

## 2024-03-25 PROBLEM — J30.9 ALLERGIC RHINITIS DUE TO ALLERGEN: Status: ACTIVE | Noted: 2024-03-25

## 2024-03-25 PROCEDURE — 99213 OFFICE O/P EST LOW 20 MIN: CPT | Performed by: STUDENT IN AN ORGANIZED HEALTH CARE EDUCATION/TRAINING PROGRAM

## 2024-03-25 NOTE — PATIENT INSTRUCTIONS
"Here is the \"recipe\" to treat allergies....    Start with a nasal steroid spray. Choose ONLY one: Flonase (Fluticasone generic) or Nasacort. Generics are fine.  Use daily for at least 1 week to determine effectiveness.  This medication does not work immediately - works best after a few days of regular use.  These are available over-the-counter (meaning you can buy at most stores withOUT a prescription)    If allergies are still bad after a few days, add an antihistamine. Choose ONLY one: Claritin (non-drowsy) or Allegra (non-drowsy) or Zyrtec (medium drowsy) or Benadryl (drowsy). Generics are fine.  These work immediately. You can take them daily or just as needed for allergy symptoms.  These are available over-the-counter (meaning you can buy at most stores withOUT a prescription)    If still bad after a few weeks, discuss with your doctor about adding Singulair which prevents histamine release.  This medication does not work immediately - works best after a few days of regular use.  This is a prescription medication. A doctor visit is required.    The next step is seeing an allergist if symptoms are still not controlled.  Be sure to use a Netti Pot or saline rinses after exposure to allergens (mowing, brushing the dog, etc.) to rinse the allergens from your nose.    Mariya Glasgow, DO     "

## 2024-03-25 NOTE — ASSESSMENT & PLAN NOTE
Add Flonase  Cont Zyrtec  Call/message in 2 weeks if symptoms remain uncontrolled can add Singulair  Additional symptomatic info provided w MAGALYS

## 2024-03-25 NOTE — PROGRESS NOTES
Chief Complaint   Patient presents with    Cough     Sore throat, very bad allergy   Didn't want to be covid or flu tested        HPI:  Juan Suero is a 63 y.o. male who presents today for allergy symptoms.    Pt presents w ongoing symptoms of nasal congestion, cough, scratchy throat. Symptoms seemingly started in the last couple of weeks when the trees in front of his house bloomed and pollen count increased. Symptoms typically worse at night when he first lays down and immediately in the morning. Notices increased drainage down the back of the throat during these times which throws him into a coughing spell. Other triggers are wearing masks in crowded spaces, environmental exposures like sanding dust (he is remodeling his house).   Has been taking Zyrtec for the last 4 days and isn't sure if its helping. Can tell that its drying him out. Denies fever, chills, sick contacts. Has watery, itchy eyes now as well. Wears masks everywhere because his wife is a cancer patient. Doesn't want to risk taking anything home to her.     PE:  Vitals:    03/25/24 0754   BP: 132/80   Pulse: 60   SpO2: 96%      Body mass index is 32.4 kg/m².    Gen Appearance: NAD  HEENT: Normocephalic, EOMI, serous effusions noted behind bilateral Tms without erythema.  Heart: RRR, normal S1 and S2, no murmur  Lungs: CTA b/l, no wheezing, no crackles  MSK: Moves all extremities well, normal gait, no peripheral edema  Neuro: No focal deficits    Current Outpatient Medications   Medication Sig Dispense Refill    apixaban (ELIQUIS) 5 MG tablet tablet Take 1 tablet by mouth Every 12 (Twelve) Hours. Indications: DVT/PE (active thrombosis) 60 tablet 11    atorvastatin (LIPITOR) 10 MG tablet Take 1 tablet by mouth every night at bedtime.      cholecalciferol (VITAMIN D3) 25 MCG (1000 UT) tablet Take 1 tablet by mouth Daily.      multivitamin with minerals tablet tablet Take 1 tablet by mouth Daily.      Olmesartan-amLODIPine-HCTZ 20-5-12.5 MG tablet  Take 1 tablet by mouth Daily. 90 tablet 1    pantoprazole (PROTONIX) 40 MG EC tablet Take 1 tablet by mouth Daily.       No current facility-administered medications for this visit.        A/P:  Diagnoses and all orders for this visit:    1. Seasonal allergic rhinitis due to other allergic trigger (Primary)  Assessment & Plan:  Add Flonase  Cont Zyrtec  Call/message in 2 weeks if symptoms remain uncontrolled can add Singulair  Additional symptomatic info provided w AVS             No follow-ups on file.     Dictated Utilizing Dragon Dictation    Please note that portions of this note were completed with a voice recognition program.    Part of this note may be an electronic transcription/translation of spoken language to printed text using the Dragon Dictation System.

## 2024-08-09 ENCOUNTER — OFFICE VISIT (OUTPATIENT)
Dept: FAMILY MEDICINE CLINIC | Facility: CLINIC | Age: 64
End: 2024-08-09
Payer: COMMERCIAL

## 2024-08-09 VITALS
BODY MASS INDEX: 32.35 KG/M2 | HEART RATE: 70 BPM | SYSTOLIC BLOOD PRESSURE: 126 MMHG | WEIGHT: 226 LBS | HEIGHT: 70 IN | OXYGEN SATURATION: 95 % | DIASTOLIC BLOOD PRESSURE: 70 MMHG

## 2024-08-09 DIAGNOSIS — I26.94 MULTIPLE SUBSEGMENTAL PULMONARY EMBOLI WITHOUT ACUTE COR PULMONALE: ICD-10-CM

## 2024-08-09 DIAGNOSIS — E78.2 MIXED HYPERLIPIDEMIA: ICD-10-CM

## 2024-08-09 DIAGNOSIS — R42 DIZZINESS ON STANDING: ICD-10-CM

## 2024-08-09 DIAGNOSIS — I10 ESSENTIAL HYPERTENSION: Primary | ICD-10-CM

## 2024-08-09 PROCEDURE — 99214 OFFICE O/P EST MOD 30 MIN: CPT | Performed by: STUDENT IN AN ORGANIZED HEALTH CARE EDUCATION/TRAINING PROGRAM

## 2024-08-09 RX ORDER — CETIRIZINE HYDROCHLORIDE 10 MG/1
1 TABLET ORAL EVERY MORNING
COMMUNITY

## 2024-08-09 NOTE — PROGRESS NOTES
Chief Complaint   Patient presents with    Essential hypertension       HPI:  Juan Suero is a 64 y.o. male who presents today for BP follow up.    Patient presents for 6-month follow-up of blood pressure.  Current regimen is olmesartan-amlodipine-HCTZ 20-5-12.5 mg.  Regimen is working well to control blood pressure, reading is at goal today 126/70.  Does note some intermittent postural dizziness.  Has not experienced any falls but can feel when the dizziness is coming on and has to take a few seconds before walking.  He is very active and currently renovating his home and so has to be very cautious when he is getting on ladders.    Follows with primary care at his home-based in [place] as well.  Last seen in April, had blood work drawn at that time.  Cholesterol levels were significantly improved with initiation of Lipitor.  Renal function stable.    Considering coming off Eliquis at some point. Was put on this after multiple Pe's were found during hospitalization last August. Now approaching one year on therapy. Unsure if he's ready to come off therapy just yet.    Here for the weekend and he and wife (Stefani) will be going back to [place] next week. They are expecting a new grandson in September!    PE:  Vitals:    08/09/24 0802   BP: 126/70   Pulse: 70   SpO2: 95%      Body mass index is 32.43 kg/m².    Gen Appearance: NAD  HEENT: Normocephalic, PERRL, no thyromegaly, trachea midline  Heart: RRR, normal S1 and S2, no murmur  Lungs: CTA b/l, no wheezing, no crackles  MSK: Moves all extremities well, normal gait, no peripheral edema  Pulses: Palpable and equal b/l  Neuro: No focal deficits    Current Outpatient Medications   Medication Sig Dispense Refill    apixaban (ELIQUIS) 5 MG tablet tablet Take 1 tablet by mouth Every 12 (Twelve) Hours. Indications: DVT/PE (active thrombosis) 60 tablet 11    atorvastatin (LIPITOR) 10 MG tablet Take 1 tablet by mouth every night at bedtime.      cholecalciferol (VITAMIN D3) 25  MCG (1000 UT) tablet Take 1 tablet by mouth Daily.      multivitamin with minerals tablet tablet Take 1 tablet by mouth Daily.      Olmesartan-amLODIPine-HCTZ 20-5-12.5 MG tablet Take 1 tablet by mouth Daily. 90 tablet 1    pantoprazole (PROTONIX) 40 MG EC tablet Take 1 tablet by mouth Daily.      cetirizine (zyrTEC) 10 MG tablet Take 1 tablet by mouth Every Morning.       No current facility-administered medications for this visit.        A/P:  Diagnoses and all orders for this visit:    1. Essential hypertension (Primary)  Controlled with olmesartan-amlodipine-HCTZ 20-5-12.5 mg  Labs done 4/2024- stable    2. Dizziness on standing  Symptoms c/w Orthostatic hypotension. Discussed HCTZ component could be contributing but pt prefers to cont his current anti hypertensive regimen for now. He will let me know if he changes his mind. Advised to stay on top of water intake, cont with orthostatic precautions    3. Multiple subsegmental pulmonary emboli without acute cor pulmonale  Dx Aug 2023- On Eliquis therapy  Briefly discussed risks vs benefits of continuing vs DC of therapy. Will cont for now.    4. Mixed hyperlipidemia  Improving with lipitor, reviewed lipid panel from 4/2024     BMI is >= 30 and <35. (Class 1 Obesity). The following options were offered after discussion;: exercise counseling/recommendations and nutrition counseling/recommendations    Return in about 6 months (around 2/9/2025).     Dictated Utilizing Dragon Dictation    Please note that portions of this note were completed with a voice recognition program.    Part of this note may be an electronic transcription/translation of spoken language to printed text using the Dragon Dictation System.

## 2024-10-25 DIAGNOSIS — I26.94 MULTIPLE SUBSEGMENTAL PULMONARY EMBOLI WITHOUT ACUTE COR PULMONALE: ICD-10-CM

## 2024-10-25 RX ORDER — APIXABAN 5 MG/1
TABLET, FILM COATED ORAL
Qty: 60 TABLET | Refills: 5 | Status: SHIPPED | OUTPATIENT
Start: 2024-10-25

## 2024-10-25 NOTE — TELEPHONE ENCOUNTER
Rx Refill Note  Requested Prescriptions     Pending Prescriptions Disp Refills    Eliquis 5 MG tablet tablet [Pharmacy Med Name: ELIQUIS 5 MG TABLET] 60 tablet 8     Sig: TAKE 1 TABLET BY MOUTH EVERY 12 (TWELVE) HOURS. INDICATIONS: DVT/PE (ACTIVE THROMBOSIS)      Last office visit with prescribing clinician: 8/9/2024   Last telemedicine visit with prescribing clinician: Visit date not found   Next office visit with prescribing clinician: Visit date not found                         Would you like a call back once the refill request has been completed: [] Yes [] No    If the office needs to give you a call back, can they leave a voicemail: [] Yes [] No    Carmen Watkins MA  10/25/24, 12:38 EDT

## (undated) DEVICE — GLV SURG PREMIERPRO MIC LTX PF SZ7.5 BRN

## (undated) DEVICE — ENDOPATH 10MM ENDOSCOPIC BLUNT CHERRY DISSECTORS (12 POUCHES CONTAINING 3 DISSECTORS EACH): Brand: ENDOPATH

## (undated) DEVICE — LAPAROSCOPIC SCISSORS: Brand: EPIX LAPAROSCOPIC SCISSORS

## (undated) DEVICE — ENDOPOUCH RETRIEVER SPECIMEN RETRIEVAL BAGS: Brand: ENDOPOUCH RETRIEVER

## (undated) DEVICE — SUT ETHLN 2/0 PS 18IN 585H

## (undated) DEVICE — ENDOPATH XCEL BLUNT TIP TROCARS WITH SMOOTH SLEEVES: Brand: ENDOPATH XCEL

## (undated) DEVICE — BLAKE SILICONE DRAIN, 19 FR ROUND, HUBLESS WITH 1/4" BENDABLE TROCAR: Brand: BLAKE

## (undated) DEVICE — GAUZE,SPONGE,4"X4",16PLY,XRAY,STRL,LF: Brand: MEDLINE

## (undated) DEVICE — MARYLAND JAW LAPAROSCOPIC SEALER/DIVIDER COATED: Brand: LIGASURE

## (undated) DEVICE — SUT VIC 0 UR6 27IN VCP603H

## (undated) DEVICE — PK LAP LASR CHOLE 10

## (undated) DEVICE — ENDOPATH XCEL BLADELESS TROCARS WITH STABILITY SLEEVES: Brand: ENDOPATH XCEL

## (undated) DEVICE — [HIGH FLOW INSUFFLATOR,  DO NOT USE IF PACKAGE IS DAMAGED,  KEEP DRY,  KEEP AWAY FROM SUNLIGHT,  PROTECT FROM HEAT AND RADIOACTIVE SOURCES.]: Brand: PNEUMOSURE

## (undated) DEVICE — JACKSON-PRATT 100CC BULB RESERVOIR: Brand: CARDINAL HEALTH

## (undated) DEVICE — LAPAROVUE VISIBILITY SYSTEM LAPAROSCOPIC SOLUTIONS: Brand: LAPAROVUE

## (undated) DEVICE — SUT MNCRYL PLS ANTIB UD 4/0 PS2 18IN

## (undated) DEVICE — SUT SILK 3/0 SH 30IN K832H

## (undated) DEVICE — ENDOPATH XCEL UNIVERSAL TROCAR STABLILITY SLEEVES: Brand: ENDOPATH XCEL

## (undated) DEVICE — BNDR ABD PREMIUM/UNIV 10IN 27TO48IN

## (undated) DEVICE — MINI ENDOCUT SCISSOR TIP, DISPOSABLE: Brand: RENEW

## (undated) DEVICE — INTENDED FOR TISSUE SEPARATION, AND OTHER PROCEDURES THAT REQUIRE A SHARP SURGICAL BLADE TO PUNCTURE OR CUT.: Brand: BARD-PARKER ® STAINLESS STEEL BLADES

## (undated) DEVICE — GLV SURG PREMIERPRO MIC LTX PF SZ8 BRN

## (undated) DEVICE — LAPAROSCOPIC SMOKE FILTRATION SYSTEM: Brand: PALL LAPAROSHIELD® PLUS LAPAROSCOPIC SMOKE FILTRATION SYSTEM